# Patient Record
Sex: FEMALE | Race: WHITE | Employment: OTHER | URBAN - NONMETROPOLITAN AREA
[De-identification: names, ages, dates, MRNs, and addresses within clinical notes are randomized per-mention and may not be internally consistent; named-entity substitution may affect disease eponyms.]

---

## 2018-12-20 ENCOUNTER — HOSPITAL ENCOUNTER (EMERGENCY)
Age: 63
Discharge: HOME | End: 2018-12-20
Payer: OTHER MISCELLANEOUS

## 2018-12-20 ENCOUNTER — APPOINTMENT (OUTPATIENT)
Dept: GENERAL RADIOLOGY | Age: 63
End: 2018-12-20
Payer: OTHER MISCELLANEOUS

## 2018-12-20 VITALS
SYSTOLIC BLOOD PRESSURE: 124 MMHG | TEMPERATURE: 97.8 F | HEART RATE: 101 BPM | RESPIRATION RATE: 12 BRPM | DIASTOLIC BLOOD PRESSURE: 94 MMHG | OXYGEN SATURATION: 94 %

## 2018-12-20 DIAGNOSIS — S01.81XA FACIAL LACERATION, INITIAL ENCOUNTER: ICD-10-CM
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
## 2018-12-20 DIAGNOSIS — S62.101A CLOSED FRACTURE OF RIGHT WRIST, INITIAL ENCOUNTER: ICD-10-CM

## 2018-12-20 PROCEDURE — 12015 RPR F/E/E/N/L/M 7.6-12.5 CM: CPT

## 2018-12-20 PROCEDURE — 99284 EMERGENCY DEPT VISIT MOD MDM: CPT

## 2018-12-20 PROCEDURE — 73110 X-RAY EXAM OF WRIST: CPT

## 2018-12-20 RX ORDER — AMOXICILLIN 875 MG/1
875 TABLET, COATED ORAL 2 TIMES DAILY
Qty: 20 TABLET | Refills: 0 | Status: SHIPPED | OUTPATIENT
Start: 2018-12-20 | End: 2018-12-30

## 2018-12-20 RX ORDER — TOPIRAMATE 100 MG/1
100 TABLET, FILM COATED ORAL 2 TIMES DAILY
Status: ON HOLD | COMMUNITY
End: 2019-10-01 | Stop reason: HOSPADM

## 2018-12-20 RX ORDER — LIDOCAINE HYDROCHLORIDE 10 MG/ML
INJECTION, SOLUTION INFILTRATION; PERINEURAL
Status: DISCONTINUED
Start: 2018-12-20 | End: 2018-12-20 | Stop reason: HOSPADM

## 2018-12-20 RX ORDER — ACETAMINOPHEN AND CODEINE PHOSPHATE 300; 30 MG/1; MG/1
1-2 TABLET ORAL EVERY 6 HOURS PRN
Qty: 20 TABLET | Refills: 0 | Status: SHIPPED | OUTPATIENT
Start: 2018-12-20 | End: 2018-12-25

## 2018-12-20 ASSESSMENT — PAIN SCALES - GENERAL: PAINLEVEL_OUTOF10: 4

## 2018-12-20 ASSESSMENT — PAIN DESCRIPTION - ORIENTATION: ORIENTATION: RIGHT

## 2018-12-20 ASSESSMENT — ENCOUNTER SYMPTOMS: ROS SKIN COMMENTS: SEE HPI

## 2018-12-20 ASSESSMENT — PAIN DESCRIPTION - PAIN TYPE: TYPE: ACUTE PAIN

## 2018-12-20 NOTE — ED PROVIDER NOTES
products; and Soap    FAMILY HISTORY       Family History   Problem Relation Age of Onset    Diabetes Father     Other Father         CAD        SOCIAL HISTORY       Social History     Social History    Marital status:      Spouse name: N/A    Number of children: N/A    Years of education: N/A     Social History Main Topics    Smoking status: Never Smoker    Smokeless tobacco: Never Used    Alcohol use No    Drug use: No    Sexual activity: Not Asked     Other Topics Concern    None     Social History Narrative    None       REVIEW OF SYSTEMS       Review of Systems   HENT:        See HPI   Musculoskeletal:        See HPI   Skin:        See HPI   All other systems reviewed and are negative. Review of systems as in HPI & PMH otherwise negative    PHYSICAL EXAM    (up to 7 for level 4, 8 ormore for level 5)     ED Triage Vitals [12/20/18 1144]   BP Temp Temp Source Pulse Resp SpO2 Height Weight   (!) 124/94 97.8 °F (36.6 °C) Tympanic 101 12 94 % -- --       Physical Exam   Constitutional: She is oriented to person, place, and time. She appears well-developed and well-nourished. HENT:   Head: Normocephalic. Laceration right cheek   Eyes: Pupils are equal, round, and reactive to light. Conjunctivae are normal. No scleral icterus. Neck: Normal range of motion. Neck supple. Cardiovascular: Normal rate and regular rhythm. Pulmonary/Chest: Effort normal and breath sounds normal.   Musculoskeletal: Normal range of motion. Pain right wrist with movement   Neurological: She is alert and oriented to person, place, and time. No cranial nerve deficit. Coordination normal.   Skin: Skin is warm and dry. Psychiatric: She has a normal mood and affect.  Her behavior is normal. Judgment and thought content normal.       DIAGNOSTIC RESULTS     EKG: (none if blank)  All EKG's are interpreted by the Emergency Department Physician who either signs or Co-signs this chart in the absence of a

## 2019-01-08 ENCOUNTER — OFFICE VISIT (OUTPATIENT)
Dept: ORTHOPEDIC SURGERY | Age: 64
End: 2019-01-08
Payer: OTHER MISCELLANEOUS

## 2019-01-08 VITALS — WEIGHT: 180 LBS | BODY MASS INDEX: 28.25 KG/M2 | HEIGHT: 67 IN

## 2019-01-08 DIAGNOSIS — M25.531 RIGHT WRIST PAIN: Primary | ICD-10-CM

## 2019-01-08 PROCEDURE — 99203 OFFICE O/P NEW LOW 30 MIN: CPT | Performed by: ORTHOPAEDIC SURGERY

## 2019-06-10 PROBLEM — R52 PAINFUL CUTANEOUS SCAR: Status: ACTIVE | Noted: 2019-06-10

## 2019-06-10 PROBLEM — L90.5 PAINFUL CUTANEOUS SCAR: Status: ACTIVE | Noted: 2019-06-10

## 2019-09-29 ENCOUNTER — HOSPITAL ENCOUNTER (OUTPATIENT)
Age: 64
Setting detail: OBSERVATION
Discharge: HOME | End: 2019-10-01
Attending: EMERGENCY MEDICINE | Admitting: SURGERY
Payer: COMMERCIAL

## 2019-09-29 ENCOUNTER — APPOINTMENT (OUTPATIENT)
Dept: CT IMAGING | Age: 64
End: 2019-09-29
Payer: COMMERCIAL

## 2019-09-29 ENCOUNTER — APPOINTMENT (OUTPATIENT)
Dept: GENERAL RADIOLOGY | Age: 64
End: 2019-09-29
Payer: COMMERCIAL

## 2019-09-29 DIAGNOSIS — T07.XXXA MULTIPLE CONTUSIONS: ICD-10-CM
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
## 2019-09-29 DIAGNOSIS — S06.0X9A CLOSED HEAD INJURY WITH CONCUSSION, WITH LOSS OF CONSCIOUSNESS, INITIAL ENCOUNTER: ICD-10-CM

## 2019-09-29 LAB
ABSOLUTE EOS #: 0.2 K/UL (ref 0–0.4)
ABSOLUTE IMMATURE GRANULOCYTE: ABNORMAL K/UL (ref 0–0.3)
ABSOLUTE LYMPH #: 1.5 K/UL (ref 1–4.8)
ABSOLUTE MONO #: 0.7 K/UL (ref 0.1–1.3)
ALBUMIN SERPL-MCNC: 4 G/DL (ref 3.5–5.2)
ALBUMIN/GLOBULIN RATIO: ABNORMAL (ref 1–2.5)
ALP BLD-CCNC: 129 U/L (ref 35–104)
ALT SERPL-CCNC: 25 U/L (ref 5–33)
ANION GAP SERPL CALCULATED.3IONS-SCNC: 14 MMOL/L (ref 9–17)
AST SERPL-CCNC: 26 U/L
BASOPHILS # BLD: 1 % (ref 0–2)
BASOPHILS ABSOLUTE: 0 K/UL (ref 0–0.2)
BILIRUB SERPL-MCNC: 0.35 MG/DL (ref 0.3–1.2)
BUN BLDV-MCNC: 14 MG/DL (ref 8–23)
BUN/CREAT BLD: ABNORMAL (ref 9–20)
CALCIUM SERPL-MCNC: 9.3 MG/DL (ref 8.6–10.4)
CHLORIDE BLD-SCNC: 102 MMOL/L (ref 98–107)
CO2: 24 MMOL/L (ref 20–31)
CREAT SERPL-MCNC: 0.9 MG/DL (ref 0.5–0.9)
DIFFERENTIAL TYPE: ABNORMAL
EOSINOPHILS RELATIVE PERCENT: 2 % (ref 0–4)
GFR AFRICAN AMERICAN: >60 ML/MIN
GFR NON-AFRICAN AMERICAN: >60 ML/MIN
GFR SERPL CREATININE-BSD FRML MDRD: ABNORMAL ML/MIN/{1.73_M2}
GFR SERPL CREATININE-BSD FRML MDRD: ABNORMAL ML/MIN/{1.73_M2}
GLUCOSE BLD-MCNC: 156 MG/DL (ref 70–99)
HCT VFR BLD CALC: 42.5 % (ref 36–46)
HEMOGLOBIN: 14.4 G/DL (ref 12–16)
IMMATURE GRANULOCYTES: ABNORMAL %
INR BLD: 0.9
LYMPHOCYTES # BLD: 17 % (ref 24–44)
MCH RBC QN AUTO: 28.5 PG (ref 26–34)
MCHC RBC AUTO-ENTMCNC: 33.9 G/DL (ref 31–37)
MCV RBC AUTO: 84 FL (ref 80–100)
MONOCYTES # BLD: 9 % (ref 1–7)
NRBC AUTOMATED: ABNORMAL PER 100 WBC
PDW BLD-RTO: 13.9 % (ref 11.5–14.9)
PLATELET # BLD: 273 K/UL (ref 150–450)
PLATELET ESTIMATE: ABNORMAL
PMV BLD AUTO: 8.4 FL (ref 6–12)
POTASSIUM SERPL-SCNC: 3.7 MMOL/L (ref 3.7–5.3)
PROTHROMBIN TIME: 12.5 SEC (ref 11.8–14.6)
RBC # BLD: 5.06 M/UL (ref 4–5.2)
RBC # BLD: ABNORMAL 10*6/UL
SEG NEUTROPHILS: 71 % (ref 36–66)
SEGMENTED NEUTROPHILS ABSOLUTE COUNT: 6.1 K/UL (ref 1.3–9.1)
SODIUM BLD-SCNC: 140 MMOL/L (ref 135–144)
TOTAL PROTEIN: 7 G/DL (ref 6.4–8.3)
TROPONIN INTERP: NORMAL
TROPONIN T: NORMAL NG/ML
TROPONIN, HIGH SENSITIVITY: <6 NG/L (ref 0–14)
WBC # BLD: 8.5 K/UL (ref 3.5–11)
WBC # BLD: ABNORMAL 10*3/UL

## 2019-09-29 PROCEDURE — 93005 ELECTROCARDIOGRAM TRACING: CPT | Performed by: EMERGENCY MEDICINE

## 2019-09-29 PROCEDURE — 96375 TX/PRO/DX INJ NEW DRUG ADDON: CPT

## 2019-09-29 PROCEDURE — G0378 HOSPITAL OBSERVATION PER HR: HCPCS

## 2019-09-29 PROCEDURE — 96376 TX/PRO/DX INJ SAME DRUG ADON: CPT

## 2019-09-29 PROCEDURE — 2580000003 HC RX 258: Performed by: EMERGENCY MEDICINE

## 2019-09-29 PROCEDURE — 84484 ASSAY OF TROPONIN QUANT: CPT

## 2019-09-29 PROCEDURE — 73562 X-RAY EXAM OF KNEE 3: CPT

## 2019-09-29 PROCEDURE — 73130 X-RAY EXAM OF HAND: CPT

## 2019-09-29 PROCEDURE — 36415 COLL VENOUS BLD VENIPUNCTURE: CPT

## 2019-09-29 PROCEDURE — 74177 CT ABD & PELVIS W/CONTRAST: CPT

## 2019-09-29 PROCEDURE — 72125 CT NECK SPINE W/O DYE: CPT

## 2019-09-29 PROCEDURE — 70450 CT HEAD/BRAIN W/O DYE: CPT

## 2019-09-29 PROCEDURE — 6360000004 HC RX CONTRAST MEDICATION: Performed by: EMERGENCY MEDICINE

## 2019-09-29 PROCEDURE — 80053 COMPREHEN METABOLIC PANEL: CPT

## 2019-09-29 PROCEDURE — 6370000000 HC RX 637 (ALT 250 FOR IP): Performed by: SURGERY

## 2019-09-29 PROCEDURE — 85025 COMPLETE CBC W/AUTO DIFF WBC: CPT

## 2019-09-29 PROCEDURE — 96374 THER/PROPH/DIAG INJ IV PUSH: CPT

## 2019-09-29 PROCEDURE — 6360000002 HC RX W HCPCS: Performed by: EMERGENCY MEDICINE

## 2019-09-29 PROCEDURE — 90471 IMMUNIZATION ADMIN: CPT | Performed by: EMERGENCY MEDICINE

## 2019-09-29 PROCEDURE — 85610 PROTHROMBIN TIME: CPT

## 2019-09-29 PROCEDURE — 99285 EMERGENCY DEPT VISIT HI MDM: CPT

## 2019-09-29 PROCEDURE — 70486 CT MAXILLOFACIAL W/O DYE: CPT

## 2019-09-29 PROCEDURE — 96365 THER/PROPH/DIAG IV INF INIT: CPT

## 2019-09-29 PROCEDURE — 90715 TDAP VACCINE 7 YRS/> IM: CPT | Performed by: EMERGENCY MEDICINE

## 2019-09-29 RX ORDER — NORTRIPTYLINE HYDROCHLORIDE 10 MG/1
10 CAPSULE ORAL PRN
COMMUNITY
End: 2022-02-16

## 2019-09-29 RX ORDER — ONDANSETRON 2 MG/ML
4 INJECTION INTRAMUSCULAR; INTRAVENOUS EVERY 8 HOURS PRN
Status: DISCONTINUED | OUTPATIENT
Start: 2019-09-29 | End: 2019-10-01 | Stop reason: HOSPADM

## 2019-09-29 RX ORDER — 0.9 % SODIUM CHLORIDE 0.9 %
80 INTRAVENOUS SOLUTION INTRAVENOUS ONCE
Status: COMPLETED | OUTPATIENT
Start: 2019-09-29 | End: 2019-09-29

## 2019-09-29 RX ORDER — SODIUM CHLORIDE 0.9 % (FLUSH) 0.9 %
10 SYRINGE (ML) INJECTION PRN
Status: DISCONTINUED | OUTPATIENT
Start: 2019-09-29 | End: 2019-10-01 | Stop reason: HOSPADM

## 2019-09-29 RX ORDER — FENTANYL CITRATE 50 UG/ML
50 INJECTION, SOLUTION INTRAMUSCULAR; INTRAVENOUS
Status: DISCONTINUED | OUTPATIENT
Start: 2019-09-29 | End: 2019-10-01 | Stop reason: HOSPADM

## 2019-09-29 RX ORDER — SODIUM CHLORIDE 9 MG/ML
INJECTION, SOLUTION INTRAVENOUS CONTINUOUS
Status: DISCONTINUED | OUTPATIENT
Start: 2019-09-29 | End: 2019-10-01 | Stop reason: HOSPADM

## 2019-09-29 RX ORDER — SODIUM CHLORIDE 0.9 % (FLUSH) 0.9 %
10 SYRINGE (ML) INJECTION EVERY 12 HOURS SCHEDULED
Status: DISCONTINUED | OUTPATIENT
Start: 2019-09-29 | End: 2019-10-01 | Stop reason: HOSPADM

## 2019-09-29 RX ORDER — PROMETHAZINE HYDROCHLORIDE 25 MG/1
25 TABLET ORAL EVERY 6 HOURS PRN
COMMUNITY
End: 2021-01-28

## 2019-09-29 RX ORDER — ACETAMINOPHEN 325 MG/1
650 TABLET ORAL EVERY 4 HOURS PRN
Status: DISCONTINUED | OUTPATIENT
Start: 2019-09-29 | End: 2019-10-01 | Stop reason: HOSPADM

## 2019-09-29 RX ORDER — FENTANYL CITRATE 50 UG/ML
100 INJECTION, SOLUTION INTRAMUSCULAR; INTRAVENOUS ONCE
Status: COMPLETED | OUTPATIENT
Start: 2019-09-29 | End: 2019-09-29

## 2019-09-29 RX ORDER — HYDROCODONE BITARTRATE AND ACETAMINOPHEN 5; 325 MG/1; MG/1
1 TABLET ORAL EVERY 4 HOURS PRN
Status: DISCONTINUED | OUTPATIENT
Start: 2019-09-29 | End: 2019-10-01 | Stop reason: HOSPADM

## 2019-09-29 RX ADMIN — SODIUM CHLORIDE: 9 INJECTION, SOLUTION INTRAVENOUS at 18:53

## 2019-09-29 RX ADMIN — HYDROCODONE BITARTRATE AND ACETAMINOPHEN 1 TABLET: 5; 325 TABLET ORAL at 18:52

## 2019-09-29 RX ADMIN — DEXTROSE MONOHYDRATE 1 G: 5 INJECTION INTRAVENOUS at 17:10

## 2019-09-29 RX ADMIN — FENTANYL CITRATE 100 MCG: 50 INJECTION INTRAMUSCULAR; INTRAVENOUS at 14:37

## 2019-09-29 RX ADMIN — SODIUM CHLORIDE 80 ML: 9 INJECTION, SOLUTION INTRAVENOUS at 15:33

## 2019-09-29 RX ADMIN — Medication 10 ML: at 15:34

## 2019-09-29 RX ADMIN — IOVERSOL 75 ML: 741 INJECTION INTRA-ARTERIAL; INTRAVENOUS at 15:33

## 2019-09-29 RX ADMIN — HYDROCODONE BITARTRATE AND ACETAMINOPHEN 1 TABLET: 5; 325 TABLET ORAL at 23:04

## 2019-09-29 RX ADMIN — FENTANYL CITRATE 100 MCG: 50 INJECTION INTRAMUSCULAR; INTRAVENOUS at 17:09

## 2019-09-29 RX ADMIN — TETANUS TOXOID, REDUCED DIPHTHERIA TOXOID AND ACELLULAR PERTUSSIS VACCINE, ADSORBED 0.5 ML: 5; 2.5; 8; 8; 2.5 SUSPENSION INTRAMUSCULAR at 17:06

## 2019-09-29 ASSESSMENT — PAIN SCALES - GENERAL
PAINLEVEL_OUTOF10: 10
PAINLEVEL_OUTOF10: 7
PAINLEVEL_OUTOF10: 6
PAINLEVEL_OUTOF10: 4
PAINLEVEL_OUTOF10: 4
PAINLEVEL_OUTOF10: 3
PAINLEVEL_OUTOF10: 7
PAINLEVEL_OUTOF10: 8
PAINLEVEL_OUTOF10: 3

## 2019-09-29 ASSESSMENT — ENCOUNTER SYMPTOMS
COUGH: 0
ABDOMINAL PAIN: 1

## 2019-09-30 PROCEDURE — 6370000000 HC RX 637 (ALT 250 FOR IP): Performed by: SURGERY

## 2019-09-30 PROCEDURE — 2580000003 HC RX 258: Performed by: EMERGENCY MEDICINE

## 2019-09-30 PROCEDURE — 99219 PR INITIAL OBSERVATION CARE/DAY 50 MINUTES: CPT | Performed by: PSYCHIATRY & NEUROLOGY

## 2019-09-30 PROCEDURE — G0378 HOSPITAL OBSERVATION PER HR: HCPCS

## 2019-09-30 PROCEDURE — 95819 EEG AWAKE AND ASLEEP: CPT

## 2019-09-30 PROCEDURE — 95819 EEG AWAKE AND ASLEEP: CPT | Performed by: PSYCHIATRY & NEUROLOGY

## 2019-09-30 PROCEDURE — 2580000003 HC RX 258: Performed by: SURGERY

## 2019-09-30 RX ORDER — NORTRIPTYLINE HYDROCHLORIDE 10 MG/1
10 CAPSULE ORAL DAILY PRN
Status: DISCONTINUED | OUTPATIENT
Start: 2019-09-30 | End: 2019-10-01 | Stop reason: HOSPADM

## 2019-09-30 RX ORDER — PROMETHAZINE HYDROCHLORIDE 25 MG/1
25 TABLET ORAL EVERY 6 HOURS PRN
Status: DISCONTINUED | OUTPATIENT
Start: 2019-09-30 | End: 2019-10-01 | Stop reason: HOSPADM

## 2019-09-30 RX ADMIN — SODIUM CHLORIDE: 9 INJECTION, SOLUTION INTRAVENOUS at 09:31

## 2019-09-30 RX ADMIN — Medication 10 ML: at 21:57

## 2019-09-30 RX ADMIN — HYDROCODONE BITARTRATE AND ACETAMINOPHEN 1 TABLET: 5; 325 TABLET ORAL at 23:39

## 2019-09-30 RX ADMIN — HYDROCODONE BITARTRATE AND ACETAMINOPHEN 1 TABLET: 5; 325 TABLET ORAL at 14:39

## 2019-09-30 RX ADMIN — SODIUM CHLORIDE: 9 INJECTION, SOLUTION INTRAVENOUS at 01:33

## 2019-09-30 RX ADMIN — HYDROCODONE BITARTRATE AND ACETAMINOPHEN 1 TABLET: 5; 325 TABLET ORAL at 07:38

## 2019-09-30 RX ADMIN — HYDROCODONE BITARTRATE AND ACETAMINOPHEN 1 TABLET: 5; 325 TABLET ORAL at 19:25

## 2019-09-30 RX ADMIN — SODIUM CHLORIDE: 9 INJECTION, SOLUTION INTRAVENOUS at 17:39

## 2019-09-30 RX ADMIN — Medication 10 ML: at 07:45

## 2019-09-30 ASSESSMENT — PAIN DESCRIPTION - FREQUENCY
FREQUENCY: CONTINUOUS
FREQUENCY: CONTINUOUS

## 2019-09-30 ASSESSMENT — PAIN DESCRIPTION - DESCRIPTORS
DESCRIPTORS: DISCOMFORT;SORE
DESCRIPTORS: DISCOMFORT;SORE

## 2019-09-30 ASSESSMENT — PAIN DESCRIPTION - PROGRESSION
CLINICAL_PROGRESSION: GRADUALLY WORSENING
CLINICAL_PROGRESSION: GRADUALLY WORSENING

## 2019-09-30 ASSESSMENT — PAIN SCALES - GENERAL
PAINLEVEL_OUTOF10: 6
PAINLEVEL_OUTOF10: 8
PAINLEVEL_OUTOF10: 4
PAINLEVEL_OUTOF10: 3
PAINLEVEL_OUTOF10: 9
PAINLEVEL_OUTOF10: 5
PAINLEVEL_OUTOF10: 7

## 2019-09-30 ASSESSMENT — PAIN DESCRIPTION - PAIN TYPE
TYPE: ACUTE PAIN
TYPE: ACUTE PAIN

## 2019-09-30 ASSESSMENT — PAIN DESCRIPTION - LOCATION
LOCATION: GENERALIZED
LOCATION: CHEST;ABDOMEN
LOCATION: CHEST;ABDOMEN

## 2019-09-30 ASSESSMENT — PAIN DESCRIPTION - ONSET
ONSET: ON-GOING
ONSET: ON-GOING

## 2019-10-01 VITALS
SYSTOLIC BLOOD PRESSURE: 129 MMHG | TEMPERATURE: 97.7 F | OXYGEN SATURATION: 96 % | WEIGHT: 187.39 LBS | HEIGHT: 66 IN | HEART RATE: 84 BPM | BODY MASS INDEX: 30.12 KG/M2 | DIASTOLIC BLOOD PRESSURE: 72 MMHG | RESPIRATION RATE: 15 BRPM

## 2019-10-01 LAB
EKG ATRIAL RATE: 97 BPM
EKG P AXIS: 60 DEGREES
EKG P-R INTERVAL: 152 MS
EKG Q-T INTERVAL: 328 MS
EKG QRS DURATION: 88 MS
EKG QTC CALCULATION (BAZETT): 416 MS
EKG R AXIS: 21 DEGREES
EKG T AXIS: 25 DEGREES
EKG VENTRICULAR RATE: 97 BPM
LV EF: 55 %
LVEF MODALITY: NORMAL

## 2019-10-01 PROCEDURE — 99225 PR SBSQ OBSERVATION CARE/DAY 25 MINUTES: CPT | Performed by: PSYCHIATRY & NEUROLOGY

## 2019-10-01 PROCEDURE — 2580000003 HC RX 258: Performed by: EMERGENCY MEDICINE

## 2019-10-01 PROCEDURE — 93306 TTE W/DOPPLER COMPLETE: CPT

## 2019-10-01 PROCEDURE — 6370000000 HC RX 637 (ALT 250 FOR IP): Performed by: SURGERY

## 2019-10-01 PROCEDURE — G0378 HOSPITAL OBSERVATION PER HR: HCPCS

## 2019-10-01 PROCEDURE — 97161 PT EVAL LOW COMPLEX 20 MIN: CPT

## 2019-10-01 PROCEDURE — 93010 ELECTROCARDIOGRAM REPORT: CPT | Performed by: INTERNAL MEDICINE

## 2019-10-01 PROCEDURE — 97165 OT EVAL LOW COMPLEX 30 MIN: CPT

## 2019-10-01 PROCEDURE — 2580000003 HC RX 258: Performed by: SURGERY

## 2019-10-01 RX ORDER — HYDROCODONE BITARTRATE AND ACETAMINOPHEN 5; 325 MG/1; MG/1
1 TABLET ORAL EVERY 4 HOURS PRN
Qty: 10 TABLET | Refills: 0 | Status: SHIPPED | OUTPATIENT
Start: 2019-10-01 | End: 2019-10-02 | Stop reason: SDUPTHER

## 2019-10-01 RX ADMIN — Medication 10 ML: at 07:15

## 2019-10-01 RX ADMIN — HYDROCODONE BITARTRATE AND ACETAMINOPHEN 1 TABLET: 5; 325 TABLET ORAL at 07:14

## 2019-10-01 RX ADMIN — SODIUM CHLORIDE: 9 INJECTION, SOLUTION INTRAVENOUS at 09:00

## 2019-10-01 RX ADMIN — SODIUM CHLORIDE: 9 INJECTION, SOLUTION INTRAVENOUS at 01:47

## 2019-10-01 RX ADMIN — HYDROCODONE BITARTRATE AND ACETAMINOPHEN 1 TABLET: 5; 325 TABLET ORAL at 12:25

## 2019-10-01 ASSESSMENT — PAIN DESCRIPTION - PAIN TYPE
TYPE: ACUTE PAIN

## 2019-10-01 ASSESSMENT — PAIN DESCRIPTION - DESCRIPTORS
DESCRIPTORS: ACHING

## 2019-10-01 ASSESSMENT — PAIN DESCRIPTION - ONSET: ONSET: ON-GOING

## 2019-10-01 ASSESSMENT — PAIN SCALES - GENERAL
PAINLEVEL_OUTOF10: 0
PAINLEVEL_OUTOF10: 5
PAINLEVEL_OUTOF10: 4
PAINLEVEL_OUTOF10: 3
PAINLEVEL_OUTOF10: 3
PAINLEVEL_OUTOF10: 4
PAINLEVEL_OUTOF10: 6

## 2019-10-01 ASSESSMENT — PAIN DESCRIPTION - FREQUENCY
FREQUENCY: INTERMITTENT
FREQUENCY: INTERMITTENT
FREQUENCY: CONTINUOUS

## 2019-10-01 ASSESSMENT — PAIN DESCRIPTION - LOCATION
LOCATION: BUTTOCKS
LOCATION: BACK;GENERALIZED;GROIN
LOCATION: BUTTOCKS

## 2019-12-26 ENCOUNTER — HOSPITAL ENCOUNTER (OUTPATIENT)
Dept: NUCLEAR MEDICINE | Age: 64
Discharge: HOME | End: 2019-12-28
Payer: MEDICARE

## 2019-12-26 ENCOUNTER — HOSPITAL ENCOUNTER (OUTPATIENT)
Dept: NON INVASIVE DIAGNOSTICS | Age: 64
Discharge: HOME | End: 2019-12-26
Payer: MEDICARE

## 2019-12-26 VITALS — BODY MASS INDEX: 28.25 KG/M2 | HEIGHT: 67 IN | WEIGHT: 180 LBS

## 2019-12-26 DIAGNOSIS — R06.02 SOB (SHORTNESS OF BREATH) ON EXERTION: ICD-10-CM

## 2019-12-26 DIAGNOSIS — R07.89 CHEST PRESSURE: ICD-10-CM

## 2019-12-26 LAB
LV EF: 70 %
LVEF MODALITY: NORMAL

## 2019-12-26 PROCEDURE — A9500 TC99M SESTAMIBI: HCPCS | Performed by: NURSE PRACTITIONER

## 2019-12-26 PROCEDURE — 2580000003 HC RX 258: Performed by: NURSE PRACTITIONER

## 2019-12-26 PROCEDURE — 3430000000 HC RX DIAGNOSTIC RADIOPHARMACEUTICAL: Performed by: NURSE PRACTITIONER

## 2019-12-26 PROCEDURE — 78452 HT MUSCLE IMAGE SPECT MULT: CPT

## 2019-12-26 PROCEDURE — 93017 CV STRESS TEST TRACING ONLY: CPT

## 2019-12-26 RX ORDER — ATROPINE SULFATE 0.1 MG/ML
0.5 INJECTION INTRAVENOUS EVERY 5 MIN PRN
Status: ACTIVE | OUTPATIENT
Start: 2019-12-26 | End: 2019-12-26

## 2019-12-26 RX ORDER — SODIUM CHLORIDE 0.9 % (FLUSH) 0.9 %
10 SYRINGE (ML) INJECTION PRN
Status: DISCONTINUED | OUTPATIENT
Start: 2019-12-26 | End: 2019-12-29 | Stop reason: HOSPADM

## 2019-12-26 RX ORDER — SODIUM CHLORIDE 9 MG/ML
500 INJECTION, SOLUTION INTRAVENOUS CONTINUOUS PRN
Status: ACTIVE | OUTPATIENT
Start: 2019-12-26 | End: 2019-12-26

## 2019-12-26 RX ORDER — METOPROLOL TARTRATE 5 MG/5ML
5 INJECTION INTRAVENOUS EVERY 5 MIN PRN
Status: ACTIVE | OUTPATIENT
Start: 2019-12-26 | End: 2019-12-26

## 2019-12-26 RX ORDER — ALBUTEROL SULFATE 90 UG/1
2 AEROSOL, METERED RESPIRATORY (INHALATION) PRN
Status: ACTIVE | OUTPATIENT
Start: 2019-12-26 | End: 2019-12-26

## 2019-12-26 RX ORDER — SODIUM CHLORIDE 0.9 % (FLUSH) 0.9 %
10 SYRINGE (ML) INJECTION PRN
Status: ACTIVE | OUTPATIENT
Start: 2019-12-26 | End: 2019-12-26

## 2019-12-26 RX ORDER — NITROGLYCERIN 0.4 MG/1
0.4 TABLET SUBLINGUAL EVERY 5 MIN PRN
Status: ACTIVE | OUTPATIENT
Start: 2019-12-26 | End: 2019-12-26

## 2019-12-26 RX ADMIN — Medication 10 ML: at 07:49

## 2019-12-26 RX ADMIN — TETRAKIS(2-METHOXYISOBUTYLISOCYANIDE)COPPER(I) TETRAFLUOROBORATE 41 MILLICURIE: 1 INJECTION, POWDER, LYOPHILIZED, FOR SOLUTION INTRAVENOUS at 09:10

## 2019-12-26 RX ADMIN — Medication 10 ML: at 09:10

## 2019-12-26 RX ADMIN — TETRAKIS(2-METHOXYISOBUTYLISOCYANIDE)COPPER(I) TETRAFLUOROBORATE 11.2 MILLICURIE: 1 INJECTION, POWDER, LYOPHILIZED, FOR SOLUTION INTRAVENOUS at 07:49

## 2019-12-26 RX ADMIN — Medication 10 ML: at 08:40

## 2020-10-14 ENCOUNTER — OFFICE VISIT (OUTPATIENT)
Dept: ORTHOPEDIC SURGERY | Age: 65
End: 2020-10-14
Payer: MEDICARE

## 2020-10-14 VITALS — HEIGHT: 67 IN | TEMPERATURE: 97.4 F | WEIGHT: 180 LBS | BODY MASS INDEX: 28.25 KG/M2

## 2020-10-14 PROCEDURE — 1123F ACP DISCUSS/DSCN MKR DOCD: CPT | Performed by: ORTHOPAEDIC SURGERY

## 2020-10-14 PROCEDURE — G8417 CALC BMI ABV UP PARAM F/U: HCPCS | Performed by: ORTHOPAEDIC SURGERY

## 2020-10-14 PROCEDURE — 3017F COLORECTAL CA SCREEN DOC REV: CPT | Performed by: ORTHOPAEDIC SURGERY

## 2020-10-14 PROCEDURE — G8400 PT W/DXA NO RESULTS DOC: HCPCS | Performed by: ORTHOPAEDIC SURGERY

## 2020-10-14 PROCEDURE — 99203 OFFICE O/P NEW LOW 30 MIN: CPT | Performed by: ORTHOPAEDIC SURGERY

## 2020-10-14 PROCEDURE — G8484 FLU IMMUNIZE NO ADMIN: HCPCS | Performed by: ORTHOPAEDIC SURGERY

## 2020-10-14 PROCEDURE — 4040F PNEUMOC VAC/ADMIN/RCVD: CPT | Performed by: ORTHOPAEDIC SURGERY

## 2020-10-14 PROCEDURE — G8427 DOCREV CUR MEDS BY ELIG CLIN: HCPCS | Performed by: ORTHOPAEDIC SURGERY

## 2020-10-14 PROCEDURE — 1036F TOBACCO NON-USER: CPT | Performed by: ORTHOPAEDIC SURGERY

## 2020-10-14 PROCEDURE — 1090F PRES/ABSN URINE INCON ASSESS: CPT | Performed by: ORTHOPAEDIC SURGERY

## 2020-10-14 NOTE — LETTER
10/14/2020    Alida White, APRN - CNP  [address] [address]    RE: Marcella Mckinney    Dear Dr. Gracie Campoverde,    Thank you for allowing me to participate in the care of Ms. Deshawn Peña. I had the opportunity to evaluate the patient on 10/14/2020. Attached you will find my evaluation and recommendations. Thanks again for the confidence you have expressed in me by allowing my participation in the care of your patient. I will keep you apprised of further developments in the patients treatment course as it progresses. If I can be of further assistance in any fashion, please feel free to contact me at your convenience.     Sincerely,        Danica Jones  Shoulder and Elbow Surgery

## 2020-10-14 NOTE — PROGRESS NOTES
Orthopedic Shoulder Encounter Note     Chief complaint: Right shoulder pain    HPI: Chip Mar is a 77 y.o. right-hand dominant female who presents for evaluation of her right shoulder. She is been having pain for about a month. No specific injury but states that she was engaged in a lot of home renovations, lifting heavy objects and landscaping for the past 6 weeks. She started having pain from her ear down to her scapular and on down her arm but now her pain is mostly over the posterior deltoid and anterior shoulder region as well as superior shoulder. It is a constant ache but she is also weak and is unable to raise her arm past shoulder level. She denies having any neck pain or associated numbness or tingling. She also denies any stiffness. Previous treatment:    NSAIDs: Motrin    Physical Therapy: No    Injections: None    Surgeries: None    Review of Systems:     Constitution: no fever or chills   Pain level: 2-3/10  Musculoskeletal: As noted in the HPI   Neurologic: no neurologic symptoms    Past Medical History  Nikki Boothe  has a past medical history of Anxiety, Benign essential HTN, and Frontal headache. Past Surgical History  Nikki Boothe  has a past surgical history that includes Tonsillectomy; lumbar fusion (1998); Rotator cuff repair; Hysterectomy; and back surgery. Current Medications  Current Outpatient Medications   Medication Sig Dispense Refill    Erenumab-aooe 140 MG/ML SOAJ Inject into the skin      acetaminophen (TYLENOL) 325 MG tablet Take 650 mg by mouth      promethazine (PHENERGAN) 25 MG tablet Take 25 mg by mouth every 6 hours as needed for Nausea (migraine)      nortriptyline (PAMELOR) 10 MG capsule Take 10 mg by mouth as needed       No current facility-administered medications for this visit. Allergies  Allergies have been reviewed. Nikki Boothe is allergic to latex; eggs or egg-derived products; and soap. Social History  Nikki Boothe  reports that she has never smoked.  She has never used smokeless tobacco. She reports that she does not drink alcohol or use drugs. Family History  Jahaira's family history includes Cancer in her father; Coronary Art Dis in her father; Diabetes in her father and sister; Other in her father and mother. Physical Exam:     Temp 97.4 °F (36.3 °C) (Infrared)   Ht 5' 7\" (1.702 m)   Wt 180 lb (81.6 kg)   BMI 28.19 kg/m²    General Appearance: alert, well appearing, and in no distress  Mental Status: alert, oriented to person, place, and time  Gait: normal  Head and neck: Full range of motion through the cervical spine and is nontender to palpation diffusely.   She has a negative Spurling's test.    Shoulder:    Skin: warm and dry, no rash or erythema; no swelling or obvious muscular atrophy  Vasculature: 2+ radial pulses bilaterally  Neuro: Sensation grossly intact to light touch diffusely  Tenderness: Tender to palpation over the anterior aspect of the right shoulder    ROM: (Degrees)    Right   A P   Left   A P    Elevation  90 140   Elevation  140   Abduction  90 150   Abduction  150    ER   70 85   ER   65   IR   L4    IR   T10   90 abd/ER      90 abd/ER     90 abd/IR      90 abd/IR     Crepitation  No    Crepitation No  Dyskenesia  No    Dyskenesia No      Muscle strength:    Right       Left    Deltoid   5    Deltoid   5  Supraspinatus  3    Supraspinatus  5  ER   5    ER   5  IR   5    IR   5    Special tests    Right   Rotator Cuff    Left    y   Painful arc    n   n   Pain with ER    n    y   Neer's     n    n   Hawkin's    n    n   Drop Arm    n  n   Lift off/Belly Press   n  n   ER Lag    n          AC Joint  n   AC tenderness   n  n   Cross-chest adduction  n       Labrum/biceps    n   Elliott's    n   n   Biceps sheer    n      n   Speed's/Yergason's   n    y   Tenderness Biceps Groove  n    n   Francisco's    n         Instability  n   Ant Apprehension   n    n   Post Apprehension   n    n   Ant Load shift    n    n   Post Load

## 2020-10-19 ENCOUNTER — HOSPITAL ENCOUNTER (OUTPATIENT)
Age: 65
Setting detail: SPECIMEN
Discharge: HOME | End: 2020-10-19
Payer: MEDICARE

## 2020-10-19 ENCOUNTER — HOSPITAL ENCOUNTER (OUTPATIENT)
Dept: MRI IMAGING | Facility: CLINIC | Age: 65
Discharge: HOME | End: 2020-10-21
Payer: MEDICARE

## 2020-10-19 LAB
ABSOLUTE EOS #: 0.16 K/UL (ref 0–0.44)
ABSOLUTE IMMATURE GRANULOCYTE: <0.03 K/UL (ref 0–0.3)
ABSOLUTE LYMPH #: 1.27 K/UL (ref 1.1–3.7)
ABSOLUTE MONO #: 0.69 K/UL (ref 0.1–1.2)
ALBUMIN SERPL-MCNC: 4 G/DL (ref 3.5–5.2)
ALBUMIN/GLOBULIN RATIO: 1.4 (ref 1–2.5)
ALP BLD-CCNC: 105 U/L (ref 35–104)
ALT SERPL-CCNC: 17 U/L (ref 5–33)
ANION GAP SERPL CALCULATED.3IONS-SCNC: 9 MMOL/L (ref 9–17)
AST SERPL-CCNC: 18 U/L
BASOPHILS # BLD: 1 % (ref 0–2)
BASOPHILS ABSOLUTE: 0.05 K/UL (ref 0–0.2)
BILIRUB SERPL-MCNC: 0.36 MG/DL (ref 0.3–1.2)
BUN BLDV-MCNC: 23 MG/DL (ref 8–23)
BUN/CREAT BLD: ABNORMAL (ref 9–20)
CALCIUM SERPL-MCNC: 9.8 MG/DL (ref 8.6–10.4)
CHLORIDE BLD-SCNC: 106 MMOL/L (ref 98–107)
CHOLESTEROL/HDL RATIO: 4.2
CHOLESTEROL: 226 MG/DL
CO2: 27 MMOL/L (ref 20–31)
CREAT SERPL-MCNC: 1.02 MG/DL (ref 0.5–0.9)
DIFFERENTIAL TYPE: ABNORMAL
EOSINOPHILS RELATIVE PERCENT: 3 % (ref 1–4)
GFR AFRICAN AMERICAN: >60 ML/MIN
GFR NON-AFRICAN AMERICAN: 54 ML/MIN
GFR SERPL CREATININE-BSD FRML MDRD: ABNORMAL ML/MIN/{1.73_M2}
GFR SERPL CREATININE-BSD FRML MDRD: ABNORMAL ML/MIN/{1.73_M2}
GLUCOSE BLD-MCNC: 102 MG/DL (ref 70–99)
HCT VFR BLD CALC: 46.7 % (ref 36.3–47.1)
HDLC SERPL-MCNC: 54 MG/DL
HEMOGLOBIN: 14.2 G/DL (ref 11.9–15.1)
IMMATURE GRANULOCYTES: 0 %
LDL CHOLESTEROL: 150 MG/DL (ref 0–130)
LYMPHOCYTES # BLD: 26 % (ref 24–43)
MCH RBC QN AUTO: 27.7 PG (ref 25.2–33.5)
MCHC RBC AUTO-ENTMCNC: 30.4 G/DL (ref 28.4–34.8)
MCV RBC AUTO: 91 FL (ref 82.6–102.9)
MONOCYTES # BLD: 14 % (ref 3–12)
NRBC AUTOMATED: 0 PER 100 WBC
PDW BLD-RTO: 13 % (ref 11.8–14.4)
PLATELET # BLD: 292 K/UL (ref 138–453)
PLATELET ESTIMATE: ABNORMAL
PMV BLD AUTO: 10.6 FL (ref 8.1–13.5)
POTASSIUM SERPL-SCNC: 4.6 MMOL/L (ref 3.7–5.3)
RBC # BLD: 5.13 M/UL (ref 3.95–5.11)
RBC # BLD: ABNORMAL 10*6/UL
SEG NEUTROPHILS: 56 % (ref 36–65)
SEGMENTED NEUTROPHILS ABSOLUTE COUNT: 2.77 K/UL (ref 1.5–8.1)
SODIUM BLD-SCNC: 142 MMOL/L (ref 135–144)
TOTAL PROTEIN: 6.9 G/DL (ref 6.4–8.3)
TRIGL SERPL-MCNC: 109 MG/DL
VITAMIN D 25-HYDROXY: 10.5 NG/ML (ref 30–100)
VLDLC SERPL CALC-MCNC: ABNORMAL MG/DL (ref 1–30)
WBC # BLD: 5 K/UL (ref 3.5–11.3)
WBC # BLD: ABNORMAL 10*3/UL

## 2020-10-19 PROCEDURE — 73221 MRI JOINT UPR EXTREM W/O DYE: CPT

## 2020-11-02 ENCOUNTER — OFFICE VISIT (OUTPATIENT)
Dept: ORTHOPEDIC SURGERY | Age: 65
End: 2020-11-02
Payer: MEDICARE

## 2020-11-02 VITALS — HEIGHT: 67 IN | BODY MASS INDEX: 28.25 KG/M2 | WEIGHT: 180 LBS | TEMPERATURE: 97.2 F

## 2020-11-02 PROCEDURE — G8427 DOCREV CUR MEDS BY ELIG CLIN: HCPCS | Performed by: ORTHOPAEDIC SURGERY

## 2020-11-02 PROCEDURE — 4040F PNEUMOC VAC/ADMIN/RCVD: CPT | Performed by: ORTHOPAEDIC SURGERY

## 2020-11-02 PROCEDURE — G8484 FLU IMMUNIZE NO ADMIN: HCPCS | Performed by: ORTHOPAEDIC SURGERY

## 2020-11-02 PROCEDURE — 1036F TOBACCO NON-USER: CPT | Performed by: ORTHOPAEDIC SURGERY

## 2020-11-02 PROCEDURE — G8400 PT W/DXA NO RESULTS DOC: HCPCS | Performed by: ORTHOPAEDIC SURGERY

## 2020-11-02 PROCEDURE — 99212 OFFICE O/P EST SF 10 MIN: CPT | Performed by: ORTHOPAEDIC SURGERY

## 2020-11-02 PROCEDURE — 3017F COLORECTAL CA SCREEN DOC REV: CPT | Performed by: ORTHOPAEDIC SURGERY

## 2020-11-02 PROCEDURE — 1090F PRES/ABSN URINE INCON ASSESS: CPT | Performed by: ORTHOPAEDIC SURGERY

## 2020-11-02 PROCEDURE — 1123F ACP DISCUSS/DSCN MKR DOCD: CPT | Performed by: ORTHOPAEDIC SURGERY

## 2020-11-02 PROCEDURE — G8417 CALC BMI ABV UP PARAM F/U: HCPCS | Performed by: ORTHOPAEDIC SURGERY

## 2020-11-02 NOTE — PROGRESS NOTES
HPI: Ms. Suzanne Pittman is here today to review the results of her right shoulder MRI study which was completed on 10/19/2020. I did review the images with the patient today and it demonstrates some increased signal on T2-weighted images within the supraspinatus tendon consistent with possible intrasubstance tears versus tendinitis. There is also some increased signal and degenerative changes at the level of the acromioclavicular joint. The biceps appears to be intact. No obvious significant chondral damage. No obvious significant labral damage. I had a discussion with the patient today with regards implications of her MRI findings. We also discussed treatment options available to her. Today she indicates that she is really not having much pain in her shoulder as this has significantly improved but she continues to have some weakness. Consequently I recommended proceeding conservatively with over-the-counter anti-inflammatories as needed and a course of physical therapy a prescription of which was provided today. I anticipate resolution of her symptoms with this treatment but should she have persistent pain and weakness she was encouraged to follow-up for further evaluation and treatment.

## 2020-11-09 ENCOUNTER — HOSPITAL ENCOUNTER (OUTPATIENT)
Dept: PHYSICAL THERAPY | Age: 65
Setting detail: THERAPIES SERIES
Discharge: HOME | End: 2020-11-09
Payer: MEDICARE

## 2020-11-09 PROCEDURE — 97110 THERAPEUTIC EXERCISES: CPT

## 2020-11-09 PROCEDURE — 97161 PT EVAL LOW COMPLEX 20 MIN: CPT

## 2020-11-09 NOTE — CONSULTS
[facility] Outpatient Physical Therapy              [address]              Phone: [phone]              Fax: [phone]            Physical Therapy Upper Extremity Evaluation    Date:  2020  Patient: Madhu Salgado  : 1955  MRN: [id number]  Physician: Ananya Burrell MD   Insurance: Medicare  Medical Diagnosis:  Right rotator cuff tendinitis  Rehab Codes: M75.81  Onset Date: 20  Next 's appt. : --    Subjective:   CC/HPI: Pt reports to PT with R shoulder pain. Pt states that she was doing a lot of work outside at the end of September, and although she didn't hear or feel any popping, pt states that she had pain one night after working in which she had pain from her ear and all the way down to her middle arm. Pt states that she was told that she has a partial thickness tear that would not require surgery. Currently, pt feels that strength is her biggest issue. Pt denies any numbness or tingling in her arm or hand. PMHx:     [] Unremarkable             [x] Refer to full medical chart  In EPIC     Tests: [x] X-Ray: See EPIC   [x] MRI: See EPIC   [] Other:      Medications: [x] Refer to full medical record [] None [] Other:  Allergies:      [x] Refer to full medical record [] None [] Other:    Function:  Hand Dominance  [x] Right  [] Left  Employement Retired   Job status --   Work Activities/duties  --   Recreational Activities Reading       Pain present?  No   Location --   Pain Rating currently 0/10   Pain at worse 2/10   Pain at best 0/10   Description of pain Constant   Altered Sensation None   What makes it worse --   What makes it better --   Symptom progression Gotten better   Sleep Sleeping okay              Objective:      STRENGTH    Left Right   C5 Shld Abd 3+/5, pain 5/5   Shld Flexion 3-/5, pain 5/5   Shld IR 3+/5 4+/5   Shld ER 4-/5 4/5   Shld HAB     Shld Ext     C6 Elb Flex 4/5 5/5   C7 Elb Ext 4-/5 5/5   C8 EPL     T1 Fing Abd                Prone:     Retraction     Depression     IR     Abd     Scaption     Flexion                  AROM PROM    Left Right Left Right   Shld Abd WNL 79 NA    Shld Flex  , pain at end range   Shld IR T5 T12 NA WFL   Shld ER T3 C7 NA WFL   Shld HAB                     Elbow Flex       Elbow Ext       Supination       Pronation              Wrist flex        Wrist ext       Wrist UD       Wrist RD                          ROM   Cervical    Flexion WFL   Extension WFL   Rotation L- WFL R- WFL   Sidebend L- WFL R- WFL   Retraction            TESTS (+/-) LEFT RIGHT Not Tested   Vertebral A   [x]   CRLF    x   Speeds   [x]   Neers   [x]   Bush   [x]   Empty Can   [x]   Drop Arm   [x]   Post Apprehension   [x]   Ant Apprehension    [x]   Clunk   [x]   Sulcus   [x]   Elbow varus/valgus   [x]      []      []      []       OBSERVATION No Deficit Deficit Not Tested Comments   Posture       Forward Head [] [] []    Rounded Shoulders [] [] []    Kyphosis [] [] []    Lordosis [] [] []    Lateral Shift [] [] []    Scoliosis [] [] []    Iliac Crest [] [] []    PSIS [] [] []    ASIS [] [] []    Genu Valgus [] [] []    Genu Varus [] [] []    Genu Recurvatum [] [] []    Pronation [] [] []    Supination [] [] []    Leg Length Discrp [] [] []    Slumped Sitting [] [] []    Palpation [] [x] [] Tenderness to palpation along R UT, R pec major, R lateral arm diffusely   Sensation [x] [] []    Edema [] [] [x]    Neurological [] [] [x]        Flexibility Normal LUE Deficit RUE Deficit   UTrap [] [] [x]   L.Scap [] [] [x]   Scalenes  [] [] []   Pec Major [] [] [x]   Pec Minor [] [] [x]   Lats [] [] [x]   Supinators [] [] []   Pronators [] [] []   Other:                            FUNCTION Normal Difficult Unable   Sitting [x] [] []   Standing [x] [] []   Ambulation [x] [] []   Groom/Dress [] [x] []   Lift/Carry [] [x] []   Stairs [x] [] []   Bending [x] [] []   OH reach [] [] [x]   Sit to Stand [x] [] [] Assessment: Pt reports to PT with limitations in R shoulder strength and ROM, as well as increased pain with end range PROM, and poor functional use of R arm following tear of R RTC. Pt would benefit from PT in order to help improve her ROM and strength to ease use of arm for ADLs and to help decrease pain. STG: (to be met in 6 treatments)  1. ? Pain: Pt to decrease pain levels to 2/10 with all activities to ease ADLs and use of R shoulder. 2. ? ROM: Increase AROM of R shoulder to 150* flexion/abduction and ER to T2 and IR to T9 to help improve functional ROM for self-care. 3. Independent with Home Exercise Programs    LTG: (to be met in 12 treatments)  1. Improve score of QuickDASH from 48% impaired to < 20% impaired to improve functional use of R arm for ADLs. 2. ? Strength: Improve R shoulder strength to 5/5 grossly to decrease need for compensatory techniques with ADLs. 3. Pt will demonstrate full, pain-free AROM of R arm in all planes to allow for normal use of shoulder without need for compensatory techniques. 4. Decrease pain to 0/10 with all activities    Patient goals: Improve strength    Rehab Potential:  [x] Good  [] Fair  [] Poor   Suggested Professional Referral:  [x] No  [] Yes:  Barriers to Goal Achievement[de-identified]  [x] No  [] Yes:  Domestic Concerns:  [x] No  [] Yes:    Pt. Education:  [x] Plans/Goals, Risks/Benefits discussed  [x] Home exercise program  Method of Education: [x] Verbal  [x] Demo  [x] Written  Comprehension of Education:  [x] Verbalizes understanding. [x] Demonstrates understanding. [x] Needs Review. [] Demonstrates/verbalizes understanding of HEP/Ed previously given.     Treatment Plan:  [x] Therapeutic Exercise    [] Modalities:  [] Therapeutic Activity    [] Ultrasound  [] Electrical Stimulation  [] Gait Training     [] Lumbar/Cervical Traction  [] Neuromuscular Re-education [x] Cold/hotpack [] Iontophoresis: 4 mg/mL  [x] Instruction in HEP              Dexamethasone

## 2020-11-10 ENCOUNTER — HOSPITAL ENCOUNTER (OUTPATIENT)
Dept: PHYSICAL THERAPY | Age: 65
Setting detail: THERAPIES SERIES
Discharge: HOME | End: 2020-11-10
Payer: MEDICARE

## 2020-11-10 PROCEDURE — 97110 THERAPEUTIC EXERCISES: CPT

## 2020-11-10 NOTE — FLOWSHEET NOTE
[facility] Outpatient Physical Therapy              [address]              Phone: [phone]              Fax: [phone]      Physical Therapy Daily Treatment Note    Date:  11/10/2020  Patient Name:  Ian Dobbins    :  1955  MRN: [id number]  Physician: Martha Lynne MD                              Insurance: Medicare  Medical Diagnosis:  Right rotator cuff tendinitis                   Rehab Codes: M75.81  Onset Date: 20               Next 's appt. : --  Visit# / total visits: 2/12   Cancels/No Shows: 0/0    Subjective:  Pt presents to PT with minimal pain, stating that her shoulder is more tender this morning. Pain:  [x] Yes  [] No  Location: R Shoulder   Pain Rating: (0-10 scale) 0-1/10  Pain altered Tx:  [x] No  [] Yes  Action:  Comments:    Objective:  Modalities:   Work on right shoulder RTC/Scapular stabilizer strengthening. Posterior capsule stretching. Modalities as needed. 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   2-3 times a week for 4-6 weeks. Exercises:  Exercise  R Shoulder Reps/ Time Weight/ Level Comments    UT S 3x30\"     x   Levator S 3x30\"     x   Posterior capsule S 3x30\"     x   Doorway pec S 3x30\"     x                                               Wall slides 2x12, 10\"     x    UBE 5'      x    SL ER 3x10  1#    x    SL HAB  3x10     x              PRONE:           Retraction  2x10, 5\"     x    Depression 2x10, 5\"      x          T-band:       Rows 2x12 Blue  x   Ext 2x12 Blue  x   Other:        Treatment Charges: Mins Units   []  Modalities     [x]  Ther Exercise 34 2   []  Manual Therapy     []  Ther Activities     []  Aquatics     []  Vasocompression     []  Other     Total Treatment time 34 2       Assessment: [x] Progressing toward goals. Continued with stretches added at evaluation while also adding in exercises to start working on scapular and RTC strength. Pt tolerates most exercises well, only reporting pain with SL HAB.  Initially attempted exercise with 1#, however completed with no weight d/t pain. Pt reports fatigue in scapular stabilizers following band exercises, demonstrating good technique throughout. Pt reports general fatigue following treatment, but denies any increased pain in shoulder. Pt declines need for vaso today. Plan to assess response and progress pt as tolerate. [] No change. [] Other:  [x] Patient would continue to benefit from skilled physical therapy services in order to: Improve flexibility and strength to allow for better use of R arm    STG: (to be met in 6 treatments)  1. ? Pain: Pt to decrease pain levels to 2/10 with all activities to ease ADLs and use of R shoulder. 2. ? ROM: Increase AROM of R shoulder to 150* flexion/abduction and ER to T2 and IR to T9 to help improve functional ROM for self-care. 3. Independent with Home Exercise Programs     LTG: (to be met in 12 treatments)  1. Improve score of QuickDASH from 48% impaired to < 20% impaired to improve functional use of R arm for ADLs. 2. ? Strength: Improve R shoulder strength to 5/5 grossly to decrease need for compensatory techniques with ADLs. 3. Pt will demonstrate full, pain-free AROM of R arm in all planes to allow for normal use of shoulder without need for compensatory techniques. 4. Decrease pain to 0/10 with all activities     Patient goals: Improve strength    Pt. Education:  [x] Yes  [] No  [x] Reviewed Prior HEP/Ed  Method of Education: [x] Verbal  [x] Demo  [] Written  Comprehension of Education:  [x] Verbalizes understanding. [x] Demonstrates understanding. [x] Needs review. [] Demonstrates/verbalizes HEP/Ed previously given. Plan: [x] Continue current frequency toward long and short term goals. [x] Specific Instructions for subsequent treatments: Continue tx per POC      Time In: 5571            Time Out: 8804    Electronically signed by:   Samantha Quarles PT

## 2020-11-16 ENCOUNTER — HOSPITAL ENCOUNTER (OUTPATIENT)
Dept: PHYSICAL THERAPY | Age: 65
Setting detail: THERAPIES SERIES
Discharge: HOME | End: 2020-11-16
Payer: MEDICARE

## 2020-11-16 PROCEDURE — 97110 THERAPEUTIC EXERCISES: CPT

## 2020-11-16 NOTE — FLOWSHEET NOTE
[facility] Outpatient Physical Therapy              [address]              Phone: [phone]              Fax: [phone]      Physical Therapy Daily Treatment Note    Date:  2020  Patient Name:  Yas Dunn    :  1955  MRN: [id number]  Physician: Nadege Bass MD                              Insurance: Medicare  Medical Diagnosis:  Right rotator cuff tendinitis                   Rehab Codes: M75.81  Onset Date: 20               Next 's appt. : --  Visit# / total visits: 3/12   Cancels/No Shows: 0/0    Subjective:  Pt continues to report no pain when shoulder is at rest but with movement can be 5-6/10 pain immediately depending on move. Pain:  [x] Yes  [] No  Location: R Shoulder   Pain Rating: (0-10 scale) 0-1/10  Pain altered Tx:  [x] No  [] Yes  Action:  Comments:    Objective:  Modalities:   Work on right shoulder RTC/Scapular stabilizer strengthening. Posterior capsule stretching. Modalities as needed. 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   2-3 times a week for 4-6 weeks. Exercises:  Exercise  R Shoulder Reps/ Time Weight/ Level Comments    UT S 3x30\"     x   Levator S 3x30\"     x   Posterior capsule S 3x30\"     x   Doorway pec S 3x30\"     x                                               Wall slides 2x12, 10\"     x    UBE 5'      x    SL ER 3x10  1#    x    SL HAB 3x10     x    SL Shoulder Flex 3x10        PRONE:           Retraction  2x10, 5\"         Depression 2x10, 5\"                T-band:       Rows 2x12 Blue  x   Ext 2x12 Blue  x   Other:        Treatment Charges: Mins Units   []  Modalities     [x]  Ther Exercise 34 2   []  Manual Therapy     []  Ther Activities     []  Aquatics     []  Vasocompression     []  Other     Total Treatment time 34 2       Assessment: [x] Progressing toward goals.  Continued with most recent exercises with notable fatigue this date towards end of session along with increased pain symptoms reported especially with sidelying HAB. Patient declined vasocompression this date but strongly advised to ice at home and to remain consistent with ice in order to prevent excess swelling and pain. [] No change. [] Other:  [x] Patient would continue to benefit from skilled physical therapy services in order to: Improve flexibility and strength to allow for better use of R arm    STG: (to be met in 6 treatments)  1. ? Pain: Pt to decrease pain levels to 2/10 with all activities to ease ADLs and use of R shoulder. 2. ? ROM: Increase AROM of R shoulder to 150* flexion/abduction and ER to T2 and IR to T9 to help improve functional ROM for self-care. 3. Independent with Home Exercise Programs     LTG: (to be met in 12 treatments)  1. Improve score of QuickDASH from 48% impaired to < 20% impaired to improve functional use of R arm for ADLs. 2. ? Strength: Improve R shoulder strength to 5/5 grossly to decrease need for compensatory techniques with ADLs. 3. Pt will demonstrate full, pain-free AROM of R arm in all planes to allow for normal use of shoulder without need for compensatory techniques. 4. Decrease pain to 0/10 with all activities     Patient goals: Improve strength    Pt. Education:  [x] Yes  [] No  [x] Reviewed Prior HEP/Ed  Method of Education: [x] Verbal  [x] Demo  [] Written  Comprehension of Education:  [x] Verbalizes understanding. [x] Demonstrates understanding. [x] Needs review. [] Demonstrates/verbalizes HEP/Ed previously given. Plan: [x] Continue current frequency toward long and short term goals.     [x] Specific Instructions for subsequent treatments: Continue tx per POC      Time In: 1215pm            Time Out: 1255pm    Electronically signed by:  Shane Mota PTA

## 2020-11-19 ENCOUNTER — HOSPITAL ENCOUNTER (OUTPATIENT)
Dept: PHYSICAL THERAPY | Age: 65
Setting detail: THERAPIES SERIES
Discharge: HOME | End: 2020-11-19
Payer: MEDICARE

## 2020-11-19 PROCEDURE — 97110 THERAPEUTIC EXERCISES: CPT

## 2020-11-19 NOTE — FLOWSHEET NOTE
above with addition of wall walks and pulleys. Pt able to demonstrate better ROM with pulleys and wall walks compared to wall slides, so plan to continue with those two. With wall slides, pt reports weakness in her shoulder which limits her available ROM. Cueing for proper prone exercise technique given today with good carryover. Pt denies any increased pain following today's treatment. [] No change. [] Other:  [x] Patient would continue to benefit from skilled physical therapy services in order to: Improve flexibility and strength to allow for better use of R arm    STG: (to be met in 6 treatments)  1. ? Pain: Pt to decrease pain levels to 2/10 with all activities to ease ADLs and use of R shoulder. 2. ? ROM: Increase AROM of R shoulder to 150* flexion/abduction and ER to T2 and IR to T9 to help improve functional ROM for self-care. 3. Independent with Home Exercise Programs     LTG: (to be met in 12 treatments)  1. Improve score of QuickDASH from 48% impaired to < 20% impaired to improve functional use of R arm for ADLs. 2. ? Strength: Improve R shoulder strength to 5/5 grossly to decrease need for compensatory techniques with ADLs. 3. Pt will demonstrate full, pain-free AROM of R arm in all planes to allow for normal use of shoulder without need for compensatory techniques. 4. Decrease pain to 0/10 with all activities     Patient goals: Improve strength    Pt. Education:  [x] Yes  [] No  [x] Reviewed Prior HEP/Ed  Method of Education: [x] Verbal  [x] Demo  [] Written  Comprehension of Education:  [x] Verbalizes understanding. [x] Demonstrates understanding. [x] Needs review. [] Demonstrates/verbalizes HEP/Ed previously given. Plan: [x] Continue current frequency toward long and short term goals. [x] Specific Instructions for subsequent treatments: Continue tx per POC      Time In: 0933            Time Out: 3175    Electronically signed by:   Ankit Wahl PT

## 2020-11-24 ENCOUNTER — HOSPITAL ENCOUNTER (OUTPATIENT)
Dept: PHYSICAL THERAPY | Age: 65
Setting detail: THERAPIES SERIES
Discharge: HOME | End: 2020-11-24
Payer: MEDICARE

## 2020-11-24 PROCEDURE — 97140 MANUAL THERAPY 1/> REGIONS: CPT

## 2020-11-24 PROCEDURE — 97110 THERAPEUTIC EXERCISES: CPT

## 2020-11-24 NOTE — FLOWSHEET NOTE
[address] [place] [address] Outpatient Physical Therapy              [address] [place] [address] [address]              Phone: [phone]              Fax: [phone]      Physical Therapy Daily Treatment Note    Date:  2020  Patient Name:  Barbara Salvador    :  1955  MRN: [id number]  Physician: Richard Daugherty MD                              Insurance: Medicare  Medical Diagnosis:  Right rotator cuff tendinitis                   Rehab Codes: M75.81  Onset Date: 20               Next 's appt. : 21  Visit# / total visits: 5/12   Cancels/No Shows: 0/0    Subjective:    Pain:  [] Yes  [x] No  Location: R Shoulder   Pain Rating: (0-10 scale) 0/10  Pain altered Tx:  [x] No  [] Yes  Action:  Comments: : Pt presents to PT with no pain today. Feels some tenderness/soreness after her exercises and continues to just feel weak and mildly stiff. Objective:  Modalities:   Work on right shoulder RTC/Scapular stabilizer strengthening. Posterior capsule stretching. Modalities as needed. 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   2-3 times a week for 4-6 weeks.    Exercises:  Exercise  R Shoulder Reps/ Time Weight/ Level Comments    UT S 3x30\"        Levator S 3x30\"        Posterior capsule S 3x30\"        Doorway pec S 3x30\"     Defer to next visit                          Supine serratus punches 15 reps x 2   3'' pause at top x   Wall push-up plus  15 reps x 2    plus only (no elbow bend) x          Pulleys 5'   Defer to next visit   Wall walks 10x15\"      Wall slides w/red swiss ball 10 reps x2    1 set flexion, 1 set scaption x    UBE 5'      Defer to next visit    SL ER 15 reps x 2 1#    x    SL HAB 3x10         SL Shoulder Flex 3x10               PRONE:           Retraction 2x10, 5\"     x    Depression 2x10, 5\"      x          T-band:       Rows 2x15 Blue  x   Ext 2x12 Blue     Bilateral ER 10 reps x 3 Lime Seated x   Other: Manual therapy x 8 min  -Grade III inferior and posterior GH mobs  -STM to R upper trap, anterior deltoid and distal lat insertion        Treatment Charges: Mins Units   []  Modalities     [x]  Ther Exercise 33 2   []  Manual Therapy 8 1   []  Ther Activities     []  Aquatics     []  Vasocompression     []  Other     Total Treatment time 41 3       Assessment: [x] Progressing toward goals. [] No change. [] Other:  [x] Patient would continue to benefit from skilled physical therapy services in order to: Improve flexibility and strength to allow for better use of R arm    11/24: Good tolerance to all progressions today but continues to demonstrate weakness in R posterior cuff and scapular stabilizers. Also continues to demonstrate mild soft tissue restrictions limiting full available passive range which was addressed today. Lime resistance band dispensed to pt today for addition of bilateral seated ER. STG: (to be met in 6 treatments)  1. ? Pain: Pt to decrease pain levels to 2/10 with all activities to ease ADLs and use of R shoulder. 2. ? ROM: Increase AROM of R shoulder to 150* flexion/abduction and ER to T2 and IR to T9 to help improve functional ROM for self-care. 3. Independent with Home Exercise Programs     LTG: (to be met in 12 treatments)  1. Improve score of QuickDASH from 48% impaired to < 20% impaired to improve functional use of R arm for ADLs. 2. ? Strength: Improve R shoulder strength to 5/5 grossly to decrease need for compensatory techniques with ADLs. 3. Pt will demonstrate full, pain-free AROM of R arm in all planes to allow for normal use of shoulder without need for compensatory techniques. 4. Decrease pain to 0/10 with all activities     Patient goals: Improve strength    Pt. Education:  [x] Yes  [] No  [x] Reviewed Prior HEP/Ed  Method of Education: [x] Verbal  [x] Demo  [] Written  Comprehension of Education:  [x] Verbalizes understanding. [x] Demonstrates understanding. [x] Needs review. [] Demonstrates/verbalizes HEP/Ed previously given.      Plan: [x] Continue current frequency toward long and short term goals.     [x] Specific Instructions for subsequent treatments: Continue tx per POC      Time In: 8:02            Time Out: 8:43    Electronically signed by:  Deejay Bennett PT

## 2020-11-25 ENCOUNTER — HOSPITAL ENCOUNTER (OUTPATIENT)
Dept: PHYSICAL THERAPY | Age: 65
Setting detail: THERAPIES SERIES
Discharge: HOME | End: 2020-11-25
Payer: MEDICARE

## 2020-11-25 PROCEDURE — 97140 MANUAL THERAPY 1/> REGIONS: CPT

## 2020-11-25 PROCEDURE — 97112 NEUROMUSCULAR REEDUCATION: CPT

## 2020-11-25 PROCEDURE — 97110 THERAPEUTIC EXERCISES: CPT

## 2020-11-25 NOTE — FLOWSHEET NOTE
[address] [facility] Outpatient Physical Therapy              [address]              Phone: [phone]              Fax: [phone]      Physical Therapy Daily Treatment Note    Date:  2020  Patient Name:  Mingo Fernandez    :  1955  MRN: [id number]  Physician: Leanne Mary MD                              Insurance: Medicare  Medical Diagnosis:  Right rotator cuff tendinitis                   Rehab Codes: M75.81  Onset Date: 20               Next 's appt. : 21  Visit# / total visits:    Cancels/No Shows: 0/0    Subjective:    Pain:  [] Yes  [x] No  Location: R Shoulder   Pain Rating: (0-10 scale) 0/10  Pain altered Tx:  [x] No  [] Yes  Action:  Comments: : Denies any pain at start of visit and no significant soreness after additions from previous visit. Only reports mild tenderness and stiffness at the back of her R shoulder blade today. Objective:  Modalities:   Work on right shoulder RTC/Scapular stabilizer strengthening. Posterior capsule stretching. Modalities as needed. 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   2-3 times a week for 4-6 weeks.    Exercises:  Exercise  R Shoulder Reps/ Time Weight/ Level Comments    UT S 3x30\"        Levator S 3x30\"        Posterior capsule S 3x30\"        Doorway pec S 3x30\"         Supine shoulder flexion 10 reps x 2  1#  DB for passive overpressure at end range x    Sidelying shoulder abduction MWM 10 reps x 2   Facilitating scapular upward rotation and inferior GH glide x    Supine serratus punches 15 reps x 2   3'' pause at top    Wall push-up plus  15 reps x 2    plus only (no elbow bend)    Rhythmic stabilizations in supine; 0 deg 2'  Perturbations challenging ER/IR each; moderate resistance & speed x   Pulleys 5'   x   Wall walks 10x15\"      Wall slides w/red swiss ball 10 reps x2    1 set flexion, 1 set scaption x    UBE 6'    3' fwds/3' bwds x    SL ER 15 reps x 2 2#   x    SL HAB 3x10         SL Shoulder Flex 3x10               PRONE:           Retraction 2x10, 5\"         Depression 2x10, 5\"                T-band:       Rows 2x15 Blue     Ext 2x12 Blue     Bilateral ER 10 reps x 3 Lime Seated    Other: Manual therapy x 10'  -Grade III inferior and posterior GH mobs  -STM to R anterior deltoid, distal lat insertion and infraspinatus  -Lateral scapular glides in sidelying        Treatment Charges: Mins Units   []  Modalities     [x]  Ther Exercise 25 1   [x]  Manual Therapy 10 1   []  Ther Activities     [x]  Neuro Re-Ed 8 1   []  Vasocompression     []  Other     Total Treatment time 43 3       Assessment: [x] Progressing toward goals. [] No change. [] Other:  [x] Patient would continue to benefit from skilled physical therapy services in order to: Improve flexibility and strength to allow for better use of R arm    11/25: Good tolerance to session today with full flexion AROM noted in supine. Initiated sidelying MWMs to improve scapulohumeral rhythm/coordination with active abduction with improved range in this plane afterwards. Upright antigravity AROM continues to be limited due to weakness of RTC and scapular stabilizers and will continue to benefit from skilled PT intervention. STG: (to be met in 6 treatments)  1. ? Pain: Pt to decrease pain levels to 2/10 with all activities to ease ADLs and use of R shoulder. 2. ? ROM: Increase AROM of R shoulder to 150* flexion/abduction and ER to T2 and IR to T9 to help improve functional ROM for self-care. 3. Independent with Home Exercise Programs     LTG: (to be met in 12 treatments)  1. Improve score of QuickDASH from 48% impaired to < 20% impaired to improve functional use of R arm for ADLs. 2. ? Strength: Improve R shoulder strength to 5/5 grossly to decrease need for compensatory techniques with ADLs. 3. Pt will demonstrate full, pain-free AROM of R arm in all planes to allow for normal use of shoulder without need for compensatory techniques.   4. Decrease pain to 0/10 with all activities     Patient goals: Improve strength    Pt. Education:  [x] Yes  [] No  [x] Reviewed Prior HEP/Ed  Method of Education: [x] Verbal  [x] Demo  [] Written  Comprehension of Education:  [x] Verbalizes understanding. [x] Demonstrates understanding. [x] Needs review. [] Demonstrates/verbalizes HEP/Ed previously given. Plan: [x] Continue current frequency toward long and short term goals.     [x] Specific Instructions for subsequent treatments: Continue tx per POC      Time In:  9:25           Time Out: 10:08    Electronically signed by:  Jerilyn Tello, PT

## 2020-11-30 ENCOUNTER — HOSPITAL ENCOUNTER (OUTPATIENT)
Dept: PHYSICAL THERAPY | Age: 65
Setting detail: THERAPIES SERIES
Discharge: HOME | End: 2020-11-30
Payer: MEDICARE

## 2020-11-30 PROCEDURE — 97110 THERAPEUTIC EXERCISES: CPT

## 2020-11-30 PROCEDURE — 97140 MANUAL THERAPY 1/> REGIONS: CPT

## 2020-11-30 NOTE — FLOWSHEET NOTE
[address] Outpatient Physical Therapy              [address] [facility] [address]              Phone: [phone]              Fax: [phone]      Physical Therapy Daily Treatment Note    Date:  2020  Patient Name:  Clarita Mitchell    :  1955  MRN: [id number]  Physician: Basil Tang MD                              Insurance: Medicare  Medical Diagnosis:  Right rotator cuff tendinitis                   Rehab Codes: M75.81  Onset Date: 20               Next 's appt. : 21  Visit# / total visits:    Cancels/No Shows: 0/0    Subjective:    Pain:  [] Yes  [x] No  Location: R Shoulder   Pain Rating: (0-10 scale) 0/10  Pain altered Tx:  [x] No  [] Yes  Action:  Comments: : Pt states that she's doing well and has no pain or soreness at the moment. Felt fatigued after her previous visit but did not experience any pain. Objective:  Modalities:   Work on right shoulder RTC/Scapular stabilizer strengthening. Posterior capsule stretching. Modalities as needed. 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   2-3 times a week for 4-6 weeks.    Exercises:  Exercise  R Shoulder Reps/ Time Weight/ Level Comments Performed today= x   UT S 3x30\"        Levator S 3x30\"        Posterior capsule S 3x30\"        Doorway pec S 3x30\"         Supine shoulder flexion 10 reps x 2  1#  DB for passive overpressure at end range x    Sidelying shoulder abduction MWM 10 reps x 2   Facilitating scapular upward rotation and inferior GH glide for first set x    Supine serratus punches 15 reps x 2   3'' pause at top    Wall push-up plus  15 reps x 2    plus only (no elbow bend)    Rhythmic stabilizations in supine; 0 deg 2'  Perturbations challenging ER/IR each; moderate resistance & speed x   Pulleys 5'      Wall walks 10x15\"   x   Wall slides w/red swiss ball 10 reps x2    1 set flexion, 1 set scaption     UBE 6'    3' fwds/3' bwds x    SL ER 15 reps x 2 2#   x    SL HAB 3x10         SL Shoulder Flex 3x10               PRONE:           Retraction 2x10, 5\"        Ys, Ts, and Is 10 reps x 2    full available range x          T-band:       Rows 2x15 Blue     Ext 2x12 Blue     Bilateral ER 10 reps x 3 Lime Seated    Other: Manual therapy x 10'  -Grade III inferior and posterior GH mobs  -STM to R anterior deltoid, distal lat insertion and subscap  -Lateral scapular glides in sidelying        Treatment Charges: Mins Units   []  Modalities     [x]  Ther Exercise 34 2   [x]  Manual Therapy 10 1   []  Ther Activities     []  Neuro Re-Ed     []  Vasocompression     []  Other     Total Treatment time 44 3       Assessment: [x] Progressing toward goals. [] No change. [] Other:  [x] Patient would continue to benefit from skilled physical therapy services in order to: Improve flexibility and strength to allow for better use of R arm    11/30: Improved ease and efficiency with sidelying abduction raises with no pain and near full AROM in all planes. Good effort through PT session and verbalizes good compliance with home program. Initiated prone Ys, and Ts, and Is today which was modified to a reduced range due to fatigue/difficulty. Additional skilled strengthening and stabilization is required through newly achieved range. STG: (to be met in 6 treatments)  1. ? Pain: Pt to decrease pain levels to 2/10 with all activities to ease ADLs and use of R shoulder. 2. ? ROM: Increase AROM of R shoulder to 150* flexion/abduction and ER to T2 and IR to T9 to help improve functional ROM for self-care. 3. Independent with Home Exercise Programs     LTG: (to be met in 12 treatments)  1. Improve score of QuickDASH from 48% impaired to < 20% impaired to improve functional use of R arm for ADLs. 2. ? Strength: Improve R shoulder strength to 5/5 grossly to decrease need for compensatory techniques with ADLs.   3. Pt will demonstrate full, pain-free AROM of R arm in all planes to allow for normal use of shoulder without need for compensatory techniques. 4. Decrease pain to 0/10 with all activities     Patient goals: Improve strength    Pt. Education:  [x] Yes  [] No  [x] Reviewed Prior HEP/Ed  Method of Education: [x] Verbal  [x] Demo  [] Written  Comprehension of Education:  [x] Verbalizes understanding. [x] Demonstrates understanding. [x] Needs review. [] Demonstrates/verbalizes HEP/Ed previously given. Plan: [x] Continue current frequency toward long and short term goals.     [x] Specific Instructions for subsequent treatments: Continue tx per POC      Time In:  8:40am           Time Out: 9:24am     Electronically signed by:  Gerry Mc PT

## 2020-12-02 ENCOUNTER — HOSPITAL ENCOUNTER (OUTPATIENT)
Dept: PHYSICAL THERAPY | Age: 65
Setting detail: THERAPIES SERIES
Discharge: HOME | End: 2020-12-02
Payer: MEDICARE

## 2020-12-02 PROCEDURE — 97140 MANUAL THERAPY 1/> REGIONS: CPT

## 2020-12-02 PROCEDURE — 97110 THERAPEUTIC EXERCISES: CPT

## 2020-12-02 NOTE — FLOWSHEET NOTE
techniques with ADLs. 3. Pt will demonstrate full, pain-free AROM of R arm in all planes to allow for normal use of shoulder without need for compensatory techniques. 4. Decrease pain to 0/10 with all activities     Patient goals: Improve strength    Pt. Education:  [x] Yes  [] No  [x] Reviewed Prior HEP/Ed  Method of Education: [x] Verbal  [x] Demo  [] Written  Comprehension of Education:  [x] Verbalizes understanding. [x] Demonstrates understanding. [x] Needs review. [] Demonstrates/verbalizes HEP/Ed previously given. Plan: [x] Continue current frequency toward long and short term goals.     [x] Specific Instructions for subsequent treatments: Continue tx per POC      Time In: 8:45am            Time Out: 9:25am    Electronically signed by:  Ketty Mejia PT

## 2020-12-07 ENCOUNTER — HOSPITAL ENCOUNTER (OUTPATIENT)
Dept: PHYSICAL THERAPY | Age: 65
Setting detail: THERAPIES SERIES
Discharge: HOME | End: 2020-12-07
Payer: MEDICARE

## 2020-12-07 PROCEDURE — 97140 MANUAL THERAPY 1/> REGIONS: CPT

## 2020-12-07 PROCEDURE — 97110 THERAPEUTIC EXERCISES: CPT

## 2020-12-07 NOTE — FLOWSHEET NOTE
[address] [facility] Outpatient Physical Therapy              [address]              Phone: [phone]              Fax: [phone]      Physical Therapy Daily Treatment Note    Date:  2020  Patient Name:  Rupal Young    :  1955  MRN: [id number]  Physician: Heaven Srinivasan MD                              Insurance: Medicare  Medical Diagnosis:  Right rotator cuff tendinitis                   Rehab Codes: M75.81  Onset Date: 20               Next 's appt. : 21  Visit# / total visits:    Cancels/No Shows: 0/0    Subjective:    Pain:  [] Yes  [x] No  Location: R Shoulder   Pain Rating: (0-10 scale) 0/10  Pain altered Tx:  [x] No  [] Yes  Action:  Comments: : Pt reports more soreness in the top & back of her shoulder today but denies as pain, just tenderness likely from progressive resistance exercises    Objective:  Modalities:   Work on right shoulder RTC/Scapular stabilizer strengthening. Posterior capsule stretching. Modalities as needed. 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   2-3 times a week for 4-6 weeks. Exercises:  Exercise  R Shoulder Reps/ Time Weight/ Level Comments Performed today= x   Posterior capsule S 3x30\"        Doorway pec S 3x30\"         Supine shoulder flexion 10 reps x 2  1#  DB for passive overpressure at end range     Sidelying shoulder abduction MWM 10 reps x 2   Facilitating scapular upward rotation and inferior GH glide for first set     Supine serratus punches 15 reps x 2 2# 3'' pause at top.  R only    Wall push-up plus  15 reps x 2    plus only (no elbow bend) x   Rhythmic stabilizations in supine; 0 deg and 90 deg 2'  Perturbations challenging ER/IR each; moderate resistance & speed x   Pulleys 5'      Wall walks 10x15\"      Wall slides using red swiss/ball 15 reps x2    1 set flexion, 1 set scaption     UBE 6'    3' fwds/3' bwds x    SL ER 15 reps x 2 2#       SL HAB 3x10         SL Shoulder Flex 3x10        Behind the back towel stretch for R IR mobility 5'' hold x 15      Anterior delt raises to 90 deg 10 reps x 2      Lateral raises to 90 deg                     PRONE:           Retraction 2x10, 5\"        Ys, Ts, and Is 10 reps x 2    full available range x          T-band:       Rows 2x15 Blue     Ext 2x12 Blue     Bilateral ER 10 reps x 3 Lime Seated           Other: Manual therapy x 12'  -Grade III inferior and posterior GH mobs  -STM to R infraspinatus and levator scap  -Lateral scapular glides in sidelying        Treatment Charges: Mins Units   []  Modalities     [x]  Ther Exercise 28 2   [x]  Manual Therapy 12 1   []  Ther Activities     []  Neuro Re-Ed     []  Vasocompression     []  Other     Total Treatment time 40 3       Assessment: [x] Progressing toward goals. [] No change. [] Other:  [x] Patient would continue to benefit from skilled physical therapy services in order to: Improve flexibility and strength to allow for better use of R arm    12/7: More tightness & tenderness at R levator scap and infraspinatus today, requiring more manual techniques compared to previous visit to alleviate sx. Otherwise, reports minimal tenderness post manual intervention which she was able to maintain throughout remainder of today's session. Frequent verbal and tactile cues initially required for technique and to decrease lower back strain through her scap push ups on the wall and will require additional scapular re-education. STG: (to be met in 6 treatments)  1. ? Pain: Pt to decrease pain levels to 2/10 with all activities to ease ADLs and use of R shoulder. 2. ? ROM: Increase AROM of R shoulder to 150* flexion/abduction and ER to T2 and IR to T9 to help improve functional ROM for self-care. 3. Independent with Home Exercise Programs     LTG: (to be met in 12 treatments)  1. Improve score of QuickDASH from 48% impaired to < 20% impaired to improve functional use of R arm for ADLs.   2. ? Strength: Improve R shoulder strength to 5/5 grossly to decrease need for compensatory techniques with ADLs. 3. Pt will demonstrate full, pain-free AROM of R arm in all planes to allow for normal use of shoulder without need for compensatory techniques. 4. Decrease pain to 0/10 with all activities     Patient goals: Improve strength    Pt. Education:  [x] Yes  [] No  [x] Reviewed Prior HEP/Ed  Method of Education: [x] Verbal  [x] Demo  [] Written  Comprehension of Education:  [x] Verbalizes understanding. [x] Demonstrates understanding. [x] Needs review. [] Demonstrates/verbalizes HEP/Ed previously given. Plan: [x] Continue current frequency toward long and short term goals.     [x] Specific Instructions for subsequent treatments: Continue tx per POC      Time In: 8:02am            Time Out: 8:42am    Electronically signed by:  Emma Casey PT

## 2020-12-09 ENCOUNTER — HOSPITAL ENCOUNTER (OUTPATIENT)
Dept: PHYSICAL THERAPY | Age: 65
Setting detail: THERAPIES SERIES
Discharge: HOME | End: 2020-12-09
Payer: MEDICARE

## 2020-12-09 PROCEDURE — 97110 THERAPEUTIC EXERCISES: CPT

## 2020-12-09 PROCEDURE — 97140 MANUAL THERAPY 1/> REGIONS: CPT

## 2020-12-09 NOTE — FLOWSHEET NOTE
[facility] Outpatient Physical Therapy              [address] [facility] [address]              Phone: [phone]              Fax: [phone]      Physical Therapy Daily Treatment Note    Date:  2020  Patient Name:  Safia Gilbert    :  1955  MRN: [id number]  Physician: Brandon Aggarwal MD                              Insurance: Medicare  Medical Diagnosis:  Right rotator cuff tendinitis                   Rehab Codes: M75.81  Onset Date: 20               Next 's appt. : 21  Visit# / total visits: 10/12   Cancels/No Shows: 0/0    Subjective:    Pain:  [] Yes  [x] No  Location: R Shoulder   Pain Rating: (0-10 scale) 0/10  Pain altered Tx:  [x] No  [] Yes  Action:  Comments: : Pt states that she's doing well with less soreness in her R shoulder. Only current complaint is a chronic headache that she attributes to an incorrect contact lense prescription. Objective:  Modalities:   Work on right shoulder RTC/Scapular stabilizer strengthening. Posterior capsule stretching. Modalities as needed. 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   2-3 times a week for 4-6 weeks. Exercises:  Exercise  R Shoulder Reps/ Time Weight/ Level Comments Performed today= x   Posterior capsule S 3x30\"        Doorway pec S 3x30\"         Supine shoulder flexion 10 reps x 2  2#  DB for passive overpressure at end range x    Sidelying shoulder abduction MWM 10 reps x 2   Facilitating scapular upward rotation and inferior GH glide for first set x    Supine serratus punches 15 reps x 2 2# 3'' pause at top.  R only x   Wall push-up plus  15 reps x 2    plus only (no elbow bend)    Rhythmic stabilizations in supine; 0 deg and 90 deg 2'  Perturbations challenging ER/IR each; moderate resistance & speed    Alternating isometrics to R shoulder in supine   Mild manual perturbations at 90 deg and 140 deg x   Pulleys 5'      Wall walks 10x15\"      Wall slides using red swiss/ball 15 reps x2    1 set flexion, 1 set scaption     UBE 6'    3' fwds/3' bwds x    SL ER 15 reps x 2 2#       SL HAB 3x10         SL Shoulder Flex 3x10        Behind the back towel stretch for R IR mobility 5'' hold x 15      Anterior delt raises to 90 deg 10 reps x 2 1# Bilateral; seated x   Lateral raises to 90 deg 10 reps x 2 1# Bilateral; seated x                 PRONE:           Retraction 2x10, 5\"        Ys, Ts, and Is 10 reps x 3    full available range x          T-band:       Rows 2x15 Blue     Ext 2x12 Blue     Bilateral ER 10 reps x 2 Lime Seated x   Bilateral horizontal abduction 10 reps x 2 Limed Seated x   Other: Manual therapy x 10'  -Grade III inferior and posterior GH mobs  -STM to R distal lat insertion       Treatment Charges: Mins Units   []  Modalities     [x]  Ther Exercise 30 2   [x]  Manual Therapy 10 1   []  Ther Activities     []  Neuro Re-Ed     []  Vasocompression     []  Other     Total Treatment time 40 3       Assessment: [x] Progressing toward goals. [] No change. [] Other:  [x] Patient would continue to benefit from skilled physical therapy services in order to: Improve flexibility and strength to allow for better use of R arm    12/9: Sx continue to improve with less soreness reported post PT sessions. Gradually progressing weighted & banded resistance with stabilization activities with mild fatigue but no increased pain. Progress note due in 2 visits for formal reassessment of progress towards goals. STG: (to be met in 6 treatments)  1. ? Pain: Pt to decrease pain levels to 2/10 with all activities to ease ADLs and use of R shoulder. 2. ? ROM: Increase AROM of R shoulder to 150* flexion/abduction and ER to T2 and IR to T9 to help improve functional ROM for self-care. 3. Independent with Home Exercise Programs     LTG: (to be met in 12 treatments)  1. Improve score of QuickDASH from 48% impaired to < 20% impaired to improve functional use of R arm for ADLs.   2. ? Strength: Improve R shoulder strength to 5/5 grossly to decrease need for compensatory techniques with ADLs. 3. Pt will demonstrate full, pain-free AROM of R arm in all planes to allow for normal use of shoulder without need for compensatory techniques. 4. Decrease pain to 0/10 with all activities     Patient goals: Improve strength    Pt. Education:  [x] Yes  [] No  [x] Reviewed Prior HEP/Ed  Method of Education: [x] Verbal  [x] Demo  [] Written  Comprehension of Education:  [x] Verbalizes understanding. [x] Demonstrates understanding. [x] Needs review. [] Demonstrates/verbalizes HEP/Ed previously given. Plan: [x] Continue current frequency toward long and short term goals.     [x] Specific Instructions for subsequent treatments: Continue tx per POC      Time In: 8:01am            Time Out: 8:41am    Electronically signed by:  Helga Dawson PT

## 2020-12-15 PROBLEM — R73.9 HYPERGLYCEMIA: Status: ACTIVE | Noted: 2020-12-15

## 2020-12-15 PROBLEM — Z53.20 MAMMOGRAM DECLINED: Status: ACTIVE | Noted: 2020-12-15

## 2020-12-15 PROBLEM — E55.9 VITAMIN D DEFICIENCY: Status: ACTIVE | Noted: 2020-12-15

## 2020-12-16 ENCOUNTER — HOSPITAL ENCOUNTER (OUTPATIENT)
Dept: PHYSICAL THERAPY | Age: 65
Setting detail: THERAPIES SERIES
Discharge: HOME | End: 2020-12-16
Payer: MEDICARE

## 2020-12-16 PROCEDURE — 97110 THERAPEUTIC EXERCISES: CPT

## 2020-12-16 PROCEDURE — 97140 MANUAL THERAPY 1/> REGIONS: CPT

## 2020-12-16 NOTE — PROGRESS NOTES
[facility] Outpatient Physical Therapy              [address]              Phone: [phone]              Fax: [phone]      Physical Therapy Progress Note & Daily Treatment     Date:  2020  Patient Name:  Yas Dunn    :  1955  MRN: [id number]  Physician: Nadege Bass MD                                Insurance: Medicare  Medical Diagnosis:  Right rotator cuff tendinitis                     Rehab Codes: M75.81  Onset Date: 20                 Next 's appt. : 21  Visit# / total visits:    Cancels/No Shows: 0/0    Subjective:    Pain:  [] Yes  [x] No  Location: R Shoulder   Pain Rating: (0-10 scale) 0/10  Pain altered Tx:  [x] No  [] Yes  Action:  Comments: : Pt states that she feels like she's doing much better. Pain has been minimal and just experiences fatigue after her exercises. Feels like her mobility and strength is much better and that she feels about 75% back to her normal with use of her R arm. QuickDASH Score : 40% impaired    Objective:    *Below objectives assessed     Range of Motion  Left Range of Motion  Right Strength  Left Strength  Right   Shoulder Flexion 160 145 4+/5 4/5   Shoulder Abduction 170 160 4+/5 4/5   Shoulder Extension 70 60     Shoulder ER 80 70 4+/5 4/5   Shoulder IR 70 70 5/5 4+/5   Elbow Flexion   5/5 4+/5   Elbow Extension   4+/5 4/5   Pronation       Supination       Wrist Flexion   5/5 5/5   Wrist Extension   5/5 5/5   Apley- Cross Body Posterior shoulder Posterior shoulder n/a n/a   Apley- Gross ER T2 T2 n/a n/a   Apley- Gross IR T7 T10 n/a n/a         Modalities:   Work on right shoulder RTC/Scapular stabilizer strengthening. Posterior capsule stretching. Modalities as needed. 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   2-3 times a week for 4-6 weeks.    Exercises:  Exercise  R Shoulder Reps/ Time Weight/ Level Comments Performed today= x   Posterior capsule S 3x30\"        Doorway pec S 3x30\"         Supine shoulder flexion 10 reps x 2  2#  DB for passive overpressure at end range     Sidelying shoulder abduction  10 reps x 2  1# MWM- facilitating scapular upward rotation and inferior GH glide for first set     Supine serratus punches 15 reps x 2 2# 3'' pause at top. R only    Wall push-up plus  15 reps x 2    plus only (no elbow bend)    Rhythmic stabilizations in supine; 0 deg and 90 deg 2'  Perturbations challenging ER/IR each; moderate resistance & speed    Alternating isometrics to R shoulder in supine   Mild manual perturbations at 90 deg and 140 deg    Pulleys 5'      Wall walks 10x15\"      Wall slides using red swiss/ball 15 reps x2    1 set flexion, 1 set scaption     UBE 6'    3' fwds/3' bwds Resume next visit    SL ER 15 reps x 2 2#       SL HAB 3x10         SL Shoulder Flex 3x10        Behind the back towel stretch for R IR mobility 5'' hold x 15      Anterior delt raises to 90 deg 10 reps x 2 1# Bilateral; seated x   Lateral raises to 90 deg 10 reps x 2 1# Bilateral; seated x   B shoulder press 10 reps x 2 1# Bilateral; seated x   Ball on wall 30 reps up/down, L/R, circles 2# 2 sets @90 deg flex  2 sets @90 deg abd Resume next visit   Supine D2 PNF  10 x 2  Mild manual resistance  x          PRONE:           Retraction 2x10, 5\"        Ys, Ts, and Is 10 reps x 3  1#  full available range           T-band:       Rows 2x15 Blue     Ext 2x12 Blue     Bilateral ER 10 reps x 2 Lime Seated Resume next visit   Bilateral horizontal abduction 8 reps x 3 Lime Seated Resume next visit   Other: Manual therapy x 8'  -Grade III inferior and posterior GH mobs  -STM to R distal lat insertion     Subjective & Objective Reassessment- billed as Therex      Treatment Charges: Mins Units   []  Modalities     [x]  Ther Exercise 34 2   [x]  Manual Therapy 8 1   []  Ther Activities     []  Neuro Re-Ed     []  Vasocompression     []  Other     Total Treatment time 42 3       Assessment: [x] Progressing toward goals. [] No change. [] Other:  [x] Patient would continue to benefit from skilled physical therapy services in order to: Improve flexibility and strength to allow for better use of R arm    Pt has been seen for 12 PT visits to date for her R shoulder pain & stiffness. Overall, demonstrates excellent initial progress to date with regard to R shoulder AROM and strength with regard to all objective measures re-assessed today. Denies any recent pain and reports improving function using her R dominant arm for ADLs. However, continues to demonstrate mild ROM limitations in all planes and moderate residual weakness. Excellent compliance with HEP issued to date but will continue to benefit from skilled PT intervention as pt still has difficulty reaching into tall cabinets and using her R arm to lift household items. Will extend current POC x 6 additional visits with excellent prognosis to meet remaining goals in this time frame. STG: (to be met in 6 treatments)  1. ? Pain: Pt to decrease pain levels to 2/10 with all activities to ease ADLs and use of R shoulder. GOAL MET 12/16  2. ? ROM: Increase AROM of R shoulder to 160* flexion/abduction and ER to T2 and IR to T9 to help improve functional ROM for self-care. GOAL PROGRESSING & MODIFIED 12/16  3. Independent with Home Exercise Programs GOOD COMPLIANCE TO DATE; EXTENDING GOAL THROUGH REMAINDER OF POC     LTG: (to be met in 12 treatments)  1. Improve score of QuickDASH from 48% impaired to < 20% impaired to improve functional use of R arm for ADLs. GOAL PROGRESSING 12/16  2. ? Strength: Improve R shoulder strength to 5/5 grossly to decrease need for compensatory techniques with ADLs. GOAL PROGRESSING 12/16  3. Pt will demonstrate full, pain-free AROM of R arm in all planes to allow for normal use of shoulder without need for compensatory techniques. GOAL PROGRESSING 12/16  4.  Decrease pain to 0/10 with all activities; GOAL MET 12/16     Patient goals: Improve strength      Treatment Plan:  [x] Therapeutic Exercise   87013  [] Iontophoresis: 4 mg/mL Dexamethasone Sodium Phosphate  mAmin  94765   [x] Therapeutic Activity  76895 [x] Vasopneumatic cold with compression  31915    [] Gait Training   40198 [] Ultrasound   79976   [x] Neuromuscular Re-education  29813 [] Electrical Stimulation Unattended  13973   [x] Manual Therapy  45243 [] Electrical Stimulation Attended  44863   [] Instruction in HEP  [] Lumbar/Cervical Traction  70945   [] Aquatic Therapy   26193 [] Cold/hotpack    [] Massage   35351      [] Dry Needling, 1 or 2 muscles  57070   [] Biofeedback, first 15 minutes   04147  [] Biofeedback, additional 15 minutes   09557 [] Dry Needling, 3 or more muscles  74515      Patient Status:     [] Continue per initial plan of care. [x] Additional visits necessary. [] Other:     Requested Frequency/Duration:  2 times per week for 6 additional treatments. Pt. Education:  [x] Yes  [] No  [x] Reviewed Prior HEP/Ed  Method of Education: [x] Verbal  [x] Demo  [] Written  Comprehension of Education:  [x] Verbalizes understanding. [x] Demonstrates understanding. [x] Needs review. [x] Demonstrates/verbalizes HEP/Ed previously given.          Time In: 8:02am            Time Out: 9:44am    Electronically signed by:  Queenie Rodriguez PT        Physician Signature:________________________________Date:__________________  By signing above or cosigning this note, I have reviewed this plan of care and certify a need for medically necessary rehabilitation services.      *PLEASE SIGN ABOVE AND FAX BACK ALL PAGES*

## 2020-12-21 ENCOUNTER — HOSPITAL ENCOUNTER (OUTPATIENT)
Dept: PHYSICAL THERAPY | Age: 65
Setting detail: THERAPIES SERIES
Discharge: HOME | End: 2020-12-21
Payer: MEDICARE

## 2020-12-21 PROCEDURE — 97110 THERAPEUTIC EXERCISES: CPT

## 2020-12-21 PROCEDURE — 97140 MANUAL THERAPY 1/> REGIONS: CPT

## 2020-12-21 NOTE — FLOWSHEET NOTE
[address] [place] [address] Outpatient Physical Therapy              [address] [place] [address] [address]              Phone: [phone]              Fax: [phone]      Physical Therapy Daily Treatment Note    Date:  2020  Patient Name:  Rosie Cross    :  1955  MRN: [id number]  Physician: Beverly Gutierrez MD                              Insurance: Medicare  Medical Diagnosis:  Right rotator cuff tendinitis                   Rehab Codes: M75.81  Onset Date: 20               Next 's appt. : 21  Visit# / total visits:    Cancels/No Shows: 0/0    Subjective:    Pain:  [] Yes  [x] No  Location: R Shoulder   Pain Rating: (0-10 scale) 0/10  Pain altered Tx:  [x] No  [] Yes  Action:  Comments: : Pt states that she's doing well. Felt fatigued after her previous visit but otherwise feels better each week. Objective:  Modalities:   Work on right shoulder RTC/Scapular stabilizer strengthening. Posterior capsule stretching. Modalities as needed. 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   2-3 times a week for 4-6 weeks. Exercises:  Exercise  R Shoulder Reps/ Time Weight/ Level Comments Performed today= x   Posterior capsule S 3x30\"        Doorway pec S 3x30\"         Supine shoulder flexion 10 reps x 2  2#  DB for passive overpressure at end range     Sidelying shoulder abduction  10 reps x 2  1# MWM- facilitating scapular upward rotation and inferior GH glide for first set     Supine serratus punches 15 reps x 2 2# 3'' pause at top.  R only    Wall push-up plus  15 reps x 2    plus only (no elbow bend)    Rhythmic stabilizations in supine; 0 deg and 90 deg 2'  Perturbations challenging ER/IR each; moderate resistance & speed    Alternating isometrics to R shoulder in supine   Mild manual perturbations at 90 deg and 140 deg    Pulleys 5'      Wall walks 10x15\"      Wall slides using red swiss/ball 15 reps x2    1 set flexion, 1 set scaption     UBE 6'    3' fwds/3' bwds x    SL ER 15 reps x 2 2#    SL HAB 3x10         SL Shoulder Flex 3x10        Behind the back towel stretch for R IR mobility 5'' hold x 15      Anterior delt raises to 90 deg 10 reps x 2 1# Bilateral; standing x   Lateral raises to 90 deg 10 reps x 2 1# Bilateral; standing x   B shoulder press 10 reps x 2 1# Bilateral; standing against wall x   Ball on wall 20 reps up/down, L/R, circles 2# 2 sets @90 deg flex  2 sets @90 deg abd x          PRONE:           Retraction 2x10, 5\"        Ys, Ts, and Is 10 reps x 3  1#  full available range x          T-band:       Rows 2x15 Blue     Ext 2x12 Blue     Bilateral ER 10 reps x 2 Lime Seated    Bilateral horizontal abduction 8 reps x 3 Lime Seated    Banded scapular clocks 30 reps x 4 Lime Reaching to 11:00 and 1:00 x   Other: Manual therapy x 8'  -Grade III inferior and posterior GH mobs  -STM to R distal lat insertion       Treatment Charges: Mins Units   []  Modalities     [x]  Ther Exercise 33 2   [x]  Manual Therapy 8 1   []  Ther Activities     []  Neuro Re-Ed     []  Vasocompression     []  Other     Total Treatment time 41 3       Assessment: [x] Progressing toward goals. [] No change. [] Other:  [x] Patient would continue to benefit from skilled physical therapy services in order to: Improve flexibility and strength to allow for better use of R arm    12/21: Pt is progressing well towards goals showing less resistance at end range elevation and improving scapulohumeral mechanics. Still demonstrates mild compensation with bilateral overhead DB presses due to fatigue/weakness in R shoulder and corrected with tactile feedback against wall. STG: (to be met in 6 treatments)  1. ? Pain: Pt to decrease pain levels to 2/10 with all activities to ease ADLs and use of R shoulder. 2. ? ROM: Increase AROM of R shoulder to 150* flexion/abduction and ER to T2 and IR to T9 to help improve functional ROM for self-care.   3. Independent with Home Exercise Programs     LTG: (to be met in 12

## 2020-12-23 ENCOUNTER — HOSPITAL ENCOUNTER (OUTPATIENT)
Dept: PHYSICAL THERAPY | Age: 65
Setting detail: THERAPIES SERIES
Discharge: HOME | End: 2020-12-23
Payer: MEDICARE

## 2020-12-23 PROCEDURE — 97110 THERAPEUTIC EXERCISES: CPT

## 2020-12-23 PROCEDURE — 97140 MANUAL THERAPY 1/> REGIONS: CPT

## 2020-12-23 NOTE — FLOWSHEET NOTE
[address] Outpatient Physical Therapy              [address] [facility] [address]              Phone: [phone]              Fax: [phone]      Physical Therapy Daily Treatment Note    Date:  2020  Patient Name:  Dorian Smith    :  1955  MRN: [id number]  Physician: Sheri Lesch, MD                              Insurance: Medicare  Medical Diagnosis:  Right rotator cuff tendinitis       Rehab Codes: M75.81  Onset Date: 20                  Next 's appt. : 21  Visit# / total visits:     Cancels/No Shows: 0/0    Subjective:    Pain:  [] Yes  [x] No  Location: R Shoulder   Pain Rating: (0-10 scale) 0/10  Pain altered Tx:  [x] No  [] Yes  Action:  Comments: : Pt states that she felt a little sore after her last visit around her upper trap but feels mostly better today. Still feels a little bit tight but otherwise no pain. Objective:  Modalities:   Work on right shoulder RTC/Scapular stabilizer strengthening. Posterior capsule stretching. Modalities as needed. 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   2-3 times a week for 4-6 weeks. Exercises:  Exercise  R Shoulder Reps/ Time Weight/ Level Comments Performed today= x   Posterior capsule S 3x30\"        Doorway pec S 3x30\"         Supine shoulder flexion 10 reps x 2  2#  DB for passive overpressure at end range     Sidelying shoulder abduction  10 reps x 2  1# MWM- facilitating scapular upward rotation and inferior GH glide for first set     Supine serratus punches 15 reps x 2 2# 3'' pause at top.  R only    Wall push-up plus  15 reps x 2    plus only (no elbow bend)    Rhythmic stabilizations in supine; 0 deg and 90 deg 2'  Perturbations challenging ER/IR each; moderate resistance & speed    Alternating isometrics to R shoulder in supine   Mild manual perturbations at 90 deg and 140 deg    Pulleys 5'      Wall walks 10x15\"      Wall slides using red swiss/ball 15 reps x2    1 set flexion, 1 set scaption     UBE 6'    3' fwds/3' bwds x    SL ER 15 reps x 2    x    SL HAB 3x10         SL Shoulder Flex 3x10        Behind the back towel stretch for R IR mobility 5'' hold x 15      Anterior delt raises to 90 deg 10 reps x 2 1# Bilateral; standing    Lateral raises to 90 deg 10 reps x 2 1# Bilateral; standing    B shoulder press 10 reps x 2 1# Bilateral; standing against wall    Ball on wall 20 reps up/down, L/R, circles 2# 2 sets @90 deg flex  2 sets @90 deg abd x   R upper trap stretch 30'' x 3   x          PRONE:           Retraction 2x10, 5\"        Ys, Ts, and Is 10 reps x 3    full available range x          T-band:       Rows 2x15 Blue     Ext 15 reps x 3 Green tubing  x   ER at 0 deg 15 reps x 2 Green tubing  x   IR at 0 deg 15 reps x 2 Green tubing  x   Bilateral horizontal abduction 8 reps x 3 Lime Seated    Banded scapular clocks 30 reps x 4 Lime Reaching to 11:00 and 1:00    Other: Manual therapy x 10'  -Grade III inferior and posterior GH mobs  -STM to R distal lat insertion and upper trap  -Manual R upper trap stretch      Treatment Charges: Mins Units   []  Modalities     [x]  Ther Exercise 30 2   [x]  Manual Therapy 10 1   []  Ther Activities     []  Neuro Re-Ed     []  Vasocompression     []  Other     Total Treatment time 40 3       Assessment: [x] Progressing toward goals. [] No change. [] Other:  [x] Patient would continue to benefit from skilled physical therapy services in order to: Improve flexibility and strength to allow for better use of R arm    12/23: Mild tightness and tender trigger points along R upper trap today, likely secondary to compensatory use with fatigue during strengthening progressions last visit. Responded well to manual intervention and reviewed stretching techniques. Held overhead resistive activities, focusing primarily on RTC stabilization below shoulder level without UE compensation.  Will follow up in 1 week as pt requested to cancel next week's visits due to conflicts with family. STG: (to be met in 6 treatments)  1. ? Pain: Pt to decrease pain levels to 2/10 with all activities to ease ADLs and use of R shoulder. 2. ? ROM: Increase AROM of R shoulder to 150* flexion/abduction and ER to T2 and IR to T9 to help improve functional ROM for self-care. 3. Independent with Home Exercise Programs     LTG: (to be met in 12 treatments)  1. Improve score of QuickDASH from 48% impaired to < 20% impaired to improve functional use of R arm for ADLs. 2. ? Strength: Improve R shoulder strength to 5/5 grossly to decrease need for compensatory techniques with ADLs. 3. Pt will demonstrate full, pain-free AROM of R arm in all planes to allow for normal use of shoulder without need for compensatory techniques. 4. Decrease pain to 0/10 with all activities     Patient goals: Improve strength    Pt. Education:  [x] Yes  [] No  [x] Reviewed Prior HEP/Ed  Method of Education: [x] Verbal  [x] Demo  [] Written  Comprehension of Education:  [x] Verbalizes understanding. [x] Demonstrates understanding. [x] Needs review. [] Demonstrates/verbalizes HEP/Ed previously given. Plan: [x] Continue current frequency toward long and short term goals.     [x] Specific Instructions for subsequent treatments: Progress note next visit      Time In: 8:00am            Time Out: 8:40am    Electronically signed by:  Miguelina Antonio PT

## 2020-12-24 ENCOUNTER — HOSPITAL ENCOUNTER (OUTPATIENT)
Dept: WOMENS IMAGING | Age: 65
Discharge: HOME | End: 2020-12-26
Payer: MEDICARE

## 2020-12-24 PROCEDURE — 77080 DXA BONE DENSITY AXIAL: CPT

## 2020-12-28 ENCOUNTER — APPOINTMENT (OUTPATIENT)
Dept: PHYSICAL THERAPY | Age: 65
End: 2020-12-28
Payer: MEDICARE

## 2020-12-30 ENCOUNTER — APPOINTMENT (OUTPATIENT)
Dept: PHYSICAL THERAPY | Age: 65
End: 2020-12-30
Payer: MEDICARE

## 2021-01-04 ENCOUNTER — HOSPITAL ENCOUNTER (OUTPATIENT)
Dept: PHYSICAL THERAPY | Age: 66
Setting detail: THERAPIES SERIES
Discharge: HOME | End: 2021-01-04
Payer: MEDICARE

## 2021-01-04 PROCEDURE — 97140 MANUAL THERAPY 1/> REGIONS: CPT

## 2021-01-04 PROCEDURE — 97112 NEUROMUSCULAR REEDUCATION: CPT

## 2021-01-04 PROCEDURE — 97110 THERAPEUTIC EXERCISES: CPT

## 2021-01-04 NOTE — FLOWSHEET NOTE
[address] [place] [address] Outpatient Physical Therapy              [address] [place] [address] [address]              Phone: [phone]              Fax: [phone]      Physical Therapy Daily Treatment Note    Date:  2021  Patient Name:  Jae Rai    :  1955  MRN: [id number]  Physician: Chris Villanueva MD                              Insurance: Medicare  Medical Diagnosis:  Right rotator cuff tendinitis       Rehab Codes: M75.81  Onset Date: 20                  Next 's appt. : 21  Visit# / total visits: 15/18    Cancels/No Shows: 0/0    Subjective:    Pain:  [] Yes  [x] No  Location: R Shoulder   Pain Rating: (0-10 scale) 0/10  Pain altered Tx:  [x] No  [] Yes  Action:  Comments: : Pt verbalizes good compliance with her HEP over holidays and states that her shoulder has been feeling very good with minimal stiffness and no recent pain. Objective:  Modalities:   Work on right shoulder RTC/Scapular stabilizer strengthening. Posterior capsule stretching. Modalities as needed. 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   2-3 times a week for 4-6 weeks. Exercises:  Exercise  R Shoulder Reps/ Time Weight/ Level Comments Performed today= x   Posterior capsule S 3x30\"        Doorway pec S 3x30\"         Supine shoulder flexion 10 reps x 2  2#  DB for passive overpressure at end range     Sidelying shoulder abduction  10 reps x 2  1# MWM- facilitating scapular upward rotation and inferior GH glide for first set     Supine serratus punches 15 reps x 2 2# 3'' pause at top.  R only    Tabletop push up plus  15 reps x 2    plus only (no elbow bend) x   Rhythmic stabilizations in supine; 0 deg and 90 deg 2'  Perturbations challenging ER/IR each; moderate resistance & speed    Alternating isometrics to R shoulder in supine   Mild manual perturbations at 90 deg and 140 deg    Pulleys 5'      Wall walks 10x15\"      Wall slides using red swiss/ball 15 reps x2    1 set flexion, 1 set scaption     UBE 6'    3' fwds/3' bwds x    SL ER 15 reps x 2        SL HAB 3x10         SL Shoulder Flex 3x10        Behind the back towel stretch for R IR mobility 5'' hold x 15      Anterior delt raises to 90 deg 10 reps x 3 1# Bilateral; standing x   Lateral raises to 90 deg 10 reps x 3 1# Bilateral; standing x   B shoulder press 10 reps x 3 1# Bilateral; standing against wall x   Ball on wall 20 reps up/down, L/R, circles 2# 2 sets @90 deg flex   x   R upper trap stretch 30'' x 3      Body blade at 0 deg 30'' x 3  ER/IR x          PRONE:           Retraction 2x10, 5\"        Ys, Ts, and Is 15 reps x 2    full available range x          T-band:       Rows 2x15 Blue     Ext 15 reps x 3 Green tubing     ER at 0 deg 15 reps x 2 Green tubing     IR at 0 deg 15 reps x 2 Green tubing     Bilateral horizontal abduction 8 reps x 3 Lime Seated    Banded scapular clocks 30 reps x 3 Lime Reaching to 10:00 and 2:00 x   Other: Manual therapy x 8'  -Grade III inferior and posterior GH mobs  -STM to R distal lat insertion      Treatment Charges: Mins Units   []  Modalities     [x]  Ther Exercise 24 1   [x]  Manual Therapy 8 1   []  Ther Activities     [x]  Neuro Re-Ed 8 1   []  Vasocompression     []  Other     Total Treatment time 40 3       Assessment: [x] Progressing toward goals. [] No change. [] Other:  [x] Patient would continue to benefit from skilled physical therapy services in order to: Improve flexibility and strength to allow for better use of R arm    1/4: Minimal stiffness at start of visit today and less need for manual therapy today. Mild fatigue still with all strengthening focused activities but overall excellent improvement in functional use of R shoulder. Progress note due at next visit with likely readiness to transition to an independent home exercise program at that time. STG: (to be met in 6 treatments)  1. ? Pain: Pt to decrease pain levels to 2/10 with all activities to ease ADLs and use of R shoulder.   2. ? ROM: Increase AROM of R shoulder to 150* flexion/abduction and ER to T2 and IR to T9 to help improve functional ROM for self-care. 3. Independent with Home Exercise Programs     LTG: (to be met in 12 treatments)  1. Improve score of QuickDASH from 48% impaired to < 20% impaired to improve functional use of R arm for ADLs. 2. ? Strength: Improve R shoulder strength to 5/5 grossly to decrease need for compensatory techniques with ADLs. 3. Pt will demonstrate full, pain-free AROM of R arm in all planes to allow for normal use of shoulder without need for compensatory techniques. 4. Decrease pain to 0/10 with all activities     Patient goals: Improve strength    Pt. Education:  [x] Yes  [] No  [x] Reviewed Prior HEP/Ed  Method of Education: [x] Verbal  [x] Demo  [] Written  Comprehension of Education:  [x] Verbalizes understanding. [x] Demonstrates understanding. [x] Needs review. [] Demonstrates/verbalizes HEP/Ed previously given. Plan: [x] Continue current frequency toward long and short term goals.     [x] Specific Instructions for subsequent treatments: Progress note next visit      Time In: 8:04am            Time Out: 8:44am    Electronically signed by:  Percy Hooper, PT

## 2021-01-06 ENCOUNTER — HOSPITAL ENCOUNTER (OUTPATIENT)
Dept: PHYSICAL THERAPY | Age: 66
Setting detail: THERAPIES SERIES
Discharge: HOME | End: 2021-01-06
Payer: MEDICARE

## 2021-01-06 PROCEDURE — 97530 THERAPEUTIC ACTIVITIES: CPT

## 2021-01-06 PROCEDURE — 97110 THERAPEUTIC EXERCISES: CPT

## 2021-01-06 NOTE — DISCHARGE SUMMARY
[facility] Outpatient Physical Therapy              [facility]              Phone: [phone]              Fax: [phone]      Physical Therapy Progress Note & Discharge Summary    Date:  2021  Patient Name:  Delaney Prieto    :  1955  MRN: [id number]  Physician: Chris Roy MD                              Insurance: Medicare  Medical Diagnosis:  Right rotator cuff tendinitis       Rehab Codes: M75.81  Onset Date: 20                  Next 's appt. : 21  Visit# / total visits:     Cancels/No Shows: 0/0    Subjective:    Pain:  [] Yes  [x] No  Location: R Shoulder   Pain Rating: (0-10 scale) 0/10  Pain altered Tx:  [x] No  [] Yes  Action:  Comments: : Pt states that she's doing very well with regard to her R shoulder. Denies any recent pain or particular difficulty with any reaching ADLs. Feels like she's also gotten a lot stronger since starting PT and feels ready to finish up to an independent home program today. QuickDASH score (): 9% impaired    Objective:      Range of Motion  Left Range of Motion  Right Strength  Left Strength  Right   Shoulder Flexion 160 160 4/5 4/5   Shoulder Abduction 165 165 4+/5 4+/5   Shoulder Extension 70 70     Shoulder ER 75 75 4/5 4/5   Shoulder IR 70 70 4+/5 4+/5   Elbow Flexion   5/5 5/5   Elbow Extension   4+/5 4+/5   Pronation       Supination       Wrist Flexion       Wrist Extension       Apley- Cross Body Posterior shoulder Posterior shoulder --- ---   Kalen Lacy ER T2 T2 --- ---   Apley- Gross IR T5 T7 --- ---         Modalities:   Work on right shoulder RTC/Scapular stabilizer strengthening. Posterior capsule stretching. Modalities as needed. 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   2-3 times a week for 4-6 weeks.    Exercises:  Exercise  R Shoulder Reps/ Time Weight/ Level Comments Performed today= x   Posterior capsule S 3x30\"        Doorway pec S 3x30\"         Supine shoulder flexion 10 reps x 2  2#  DB for passive overpressure at end range     Sidelying shoulder abduction  10 reps x 2  1# MWM- facilitating scapular upward rotation and inferior GH glide for first set     Supine serratus punches 15 reps x 2 2# 3'' pause at top. R only    Tabletop push up plus  15 reps x 2    plus only (no elbow bend)    Rhythmic stabilizations in supine; 0 deg and 90 deg 2'  Perturbations challenging ER/IR each; moderate resistance & speed    Alternating isometrics to R shoulder in supine   Mild manual perturbations at 90 deg and 140 deg    Pulleys 5'      Wall walks 10x15\"      Wall slides using red swiss/ball 15 reps x2    1 set flexion, 1 set scaption     UBE 6'    3' fwds/3' bwds     SL ER 15 reps x 2        SL HAB 3x10         SL Shoulder Flex 3x10        Behind the back towel stretch for R IR mobility 5'' hold x 15      Anterior delt raises to 90 deg 10 reps x 3 1# Bilateral; standing    Lateral raises to 90 deg 10 reps x 3 1# Bilateral; standing    B shoulder press 10 reps x 3 1# Bilateral; standing against wall    Ball on wall 20 reps up/down, L/R, circles 2# 2 sets @90 deg flex      R upper trap stretch 30'' x 3      Body blade at 0 deg 30'' x 3  ER/IR           PRONE:           Retraction 2x10, 5\"        Ys, Ts, and Is 15 reps x 2    full available range           T-band:       Rows 2x15 Blue     Ext 15 reps x 3 Green tubing     ER at 0 deg 15 reps x 2 Green tubing     IR at 0 deg 15 reps x 2 Green tubing     Bilateral horizontal abduction 8 reps x 3 Lime Seated    Banded scapular clocks 30 reps x 3 Lime Reaching to 10:00 and 2:00    Other:   Final HEP instructions/review (see chart) x 8 min  Formal subjective & objective reassessment x 20 min      Treatment Charges: Mins Units   []  Modalities     [x]  Ther Exercise 8 1   []  Manual Therapy     [x]  Ther Activities 20 1   []  Neuro Re-Ed     []  Vasocompression     []  Other     Total Treatment time 28 2       Assessment: [x] Progressing toward goals.      [] No change. [x] Other: Appropriate to discharge to independent HEP      1/6: Pt has been seen for 16 PT visits to date for her chronic R shoulder pain and stiffness. Overall, demonstrates excellent progress to date with no recent pain or restrictions with any ADLs. Pt demonstrates full symmetrical AROM in all planes, other than mild residual limitations with Apley IR reach behind back. Strength is WFL and symmetrical bilaterally. Pt states that she is functioning at her desired level of function and is appropriate to discharge to an independent HEP at this time. Final written HEP provided today with good understanding and long term prognosis. STG: (to be met in 6 treatments)  1. ? Pain: Pt to decrease pain levels to 2/10 with all activities to ease ADLs and use of R shoulder. GOAL MET 1/6  2. ? ROM: Increase AROM of R shoulder to 150* flexion/abduction and ER to T2 and IR to T9 to help improve functional ROM for self-care. GOAL MET 1/6  3. Independent with Home Exercise Programs GOAL MET 1/6     LTG: (to be met in 12 treatments)  1. Improve score of QuickDASH from 48% impaired to < 20% impaired to improve functional use of R arm for ADLs. GOAL MET 1/6  2. ? Strength: Improve R shoulder strength to 5/5 grossly to decrease need for compensatory techniques with ADLs. GOAL PROGRESSING W/LONG TERM HEP 1/6  3. Pt will demonstrate full, pain-free AROM of R arm in all planes to allow for normal use of shoulder without need for compensatory techniques. GOAL MET 1/6  4. Decrease pain to 0/10 with all activities GOAL MET 1/6     Patient goals: Improve strength    Pt. Education:  [x] Yes  [] No  [x] Reviewed Prior HEP/Ed  Method of Education: [x] Verbal  [x] Demo  [] Written  Comprehension of Education:  [x] Verbalizes understanding. [x] Demonstrates understanding. [x] Needs review. [] Demonstrates/verbalizes HEP/Ed previously given.      Plan: [x] Discharge to independent HEP      Time In: 8:02am            Time Out: 8:30am    Electronically signed by:  Lisa Pierre PT

## 2021-01-14 ENCOUNTER — HOSPITAL ENCOUNTER (OUTPATIENT)
Dept: GENERAL RADIOLOGY | Facility: CLINIC | Age: 66
Discharge: HOME | End: 2021-01-16
Payer: MEDICARE

## 2021-01-14 ENCOUNTER — HOSPITAL ENCOUNTER (OUTPATIENT)
Age: 66
Setting detail: SPECIMEN
Discharge: HOME | End: 2021-01-14
Payer: MEDICARE

## 2021-01-14 ENCOUNTER — HOSPITAL ENCOUNTER (OUTPATIENT)
Facility: CLINIC | Age: 66
Discharge: HOME | End: 2021-01-16
Payer: MEDICARE

## 2021-01-14 ENCOUNTER — NURSE ONLY (OUTPATIENT)
Dept: FAMILY MEDICINE CLINIC | Age: 66
End: 2021-01-14

## 2021-01-14 DIAGNOSIS — R53.1 WEAKNESS: ICD-10-CM

## 2021-01-14 DIAGNOSIS — R06.00 DYSPNEA, UNSPECIFIED TYPE: ICD-10-CM

## 2021-01-14 DIAGNOSIS — R00.0 TACHYCARDIA: ICD-10-CM

## 2021-01-14 DIAGNOSIS — R19.7 DIARRHEA, UNSPECIFIED TYPE: ICD-10-CM

## 2021-01-14 DIAGNOSIS — B34.9 VIRAL ILLNESS: ICD-10-CM

## 2021-01-14 DIAGNOSIS — Z20.822 ENCOUNTER FOR LABORATORY TESTING FOR COVID-19 VIRUS: Primary | ICD-10-CM

## 2021-01-14 DIAGNOSIS — R63.0 DECREASED APPETITE: ICD-10-CM

## 2021-01-14 LAB
ABSOLUTE EOS #: <0.03 K/UL (ref 0–0.44)
ABSOLUTE IMMATURE GRANULOCYTE: 0.03 K/UL (ref 0–0.3)
ABSOLUTE LYMPH #: 0.86 K/UL (ref 1.1–3.7)
ABSOLUTE MONO #: 0.88 K/UL (ref 0.1–1.2)
ALBUMIN SERPL-MCNC: 3.8 G/DL (ref 3.5–5.2)
ALBUMIN/GLOBULIN RATIO: 1.1 (ref 1–2.5)
ALP BLD-CCNC: 129 U/L (ref 35–104)
ALT SERPL-CCNC: 75 U/L (ref 5–33)
ANION GAP SERPL CALCULATED.3IONS-SCNC: 16 MMOL/L (ref 9–17)
AST SERPL-CCNC: 48 U/L
BASOPHILS # BLD: 0 % (ref 0–2)
BASOPHILS ABSOLUTE: 0.03 K/UL (ref 0–0.2)
BILIRUB SERPL-MCNC: 0.56 MG/DL (ref 0.3–1.2)
BUN BLDV-MCNC: 23 MG/DL (ref 8–23)
BUN/CREAT BLD: ABNORMAL (ref 9–20)
CALCIUM SERPL-MCNC: 9.3 MG/DL (ref 8.6–10.4)
CHLORIDE BLD-SCNC: 102 MMOL/L (ref 98–107)
CO2: 22 MMOL/L (ref 20–31)
CREAT SERPL-MCNC: 0.93 MG/DL (ref 0.5–0.9)
D-DIMER QUANTITATIVE: 0.56 MG/L FEU
DIFFERENTIAL TYPE: ABNORMAL
EOSINOPHILS RELATIVE PERCENT: 0 % (ref 1–4)
GFR AFRICAN AMERICAN: >60 ML/MIN
GFR NON-AFRICAN AMERICAN: >60 ML/MIN
GFR SERPL CREATININE-BSD FRML MDRD: ABNORMAL ML/MIN/{1.73_M2}
GFR SERPL CREATININE-BSD FRML MDRD: ABNORMAL ML/MIN/{1.73_M2}
GLUCOSE BLD-MCNC: 103 MG/DL (ref 70–99)
HCT VFR BLD CALC: 48.8 % (ref 36.3–47.1)
HEMOGLOBIN: 15.4 G/DL (ref 11.9–15.1)
IMMATURE GRANULOCYTES: 0 %
LIPASE: 33 U/L (ref 13–60)
LYMPHOCYTES # BLD: 10 % (ref 24–43)
MAGNESIUM: 1.9 MG/DL (ref 1.6–2.6)
MCH RBC QN AUTO: 27.2 PG (ref 25.2–33.5)
MCHC RBC AUTO-ENTMCNC: 31.6 G/DL (ref 28.4–34.8)
MCV RBC AUTO: 86.2 FL (ref 82.6–102.9)
MONOCYTES # BLD: 11 % (ref 3–12)
NRBC AUTOMATED: 0 PER 100 WBC
PDW BLD-RTO: 12.9 % (ref 11.8–14.4)
PLATELET # BLD: 307 K/UL (ref 138–453)
PLATELET ESTIMATE: ABNORMAL
PMV BLD AUTO: 10.7 FL (ref 8.1–13.5)
POTASSIUM SERPL-SCNC: 3.8 MMOL/L (ref 3.7–5.3)
RBC # BLD: 5.66 M/UL (ref 3.95–5.11)
RBC # BLD: ABNORMAL 10*6/UL
SEG NEUTROPHILS: 79 % (ref 36–65)
SEGMENTED NEUTROPHILS ABSOLUTE COUNT: 6.46 K/UL (ref 1.5–8.1)
SODIUM BLD-SCNC: 140 MMOL/L (ref 135–144)
TOTAL PROTEIN: 7.4 G/DL (ref 6.4–8.3)
WBC # BLD: 8.3 K/UL (ref 3.5–11.3)
WBC # BLD: ABNORMAL 10*3/UL

## 2021-01-14 PROCEDURE — 71046 X-RAY EXAM CHEST 2 VIEWS: CPT

## 2021-01-15 LAB
CULTURE: NORMAL
Lab: NORMAL
SPECIMEN DESCRIPTION: NORMAL

## 2021-01-17 LAB — SARS-COV-2, NAA: DETECTED

## 2021-01-21 ENCOUNTER — HOSPITAL ENCOUNTER (OUTPATIENT)
Dept: GENERAL RADIOLOGY | Facility: CLINIC | Age: 66
Discharge: HOME | End: 2021-01-23
Payer: MEDICARE

## 2021-01-21 ENCOUNTER — HOSPITAL ENCOUNTER (OUTPATIENT)
Facility: CLINIC | Age: 66
Discharge: HOME | End: 2021-01-23
Payer: MEDICARE

## 2021-01-21 DIAGNOSIS — J12.82 PNEUMONIA DUE TO COVID-19 VIRUS: ICD-10-CM

## 2021-01-21 DIAGNOSIS — U07.1 PNEUMONIA DUE TO COVID-19 VIRUS: ICD-10-CM

## 2021-01-21 PROCEDURE — 71046 X-RAY EXAM CHEST 2 VIEWS: CPT

## 2021-02-22 ENCOUNTER — OFFICE VISIT (OUTPATIENT)
Dept: ORTHOPEDIC SURGERY | Age: 66
End: 2021-02-22

## 2021-02-22 VITALS — TEMPERATURE: 97.2 F | HEIGHT: 67 IN | RESPIRATION RATE: 14 BRPM | WEIGHT: 180 LBS | BODY MASS INDEX: 28.25 KG/M2

## 2021-02-22 DIAGNOSIS — M75.41 ROTATOR CUFF IMPINGEMENT SYNDROME, RIGHT: Primary | ICD-10-CM

## 2021-02-22 PROCEDURE — 99024 POSTOP FOLLOW-UP VISIT: CPT | Performed by: ORTHOPAEDIC SURGERY

## 2021-02-22 NOTE — PROGRESS NOTES
HPI: Ms. Reny Zamora is here today for follow-up evaluation of her right shoulder. She was last seen in my clinic back in November 2020 at which time we reviewed her MRI and she was diagnosed with some rotator cuff tendinitis. Physical therapy was prescribed. She completed her physical therapy at the beginning of the year and states that she is doing well really having no pain in the shoulder. On examination today she has full range of motion through the right shoulder with active elevation of 145 degrees, 155 degrees of abduction, 85 degrees of external rotation internal rotation to T10 which is symmetric to the contralateral side. She has a pain-free arc of motion. She has 5/5 muscle strength with resisted deltoid, supraspinatus, external rotation internal rotation testing. She has negative impingement signs. No instability. Impression and plan: Ms. Reny Zamora is a 57-year-old with right shoulder rotator cuff tendinitis thathas responded well to conservative management with the use of NSAIDs as well as physical therapy. Following our last encounter she was instructed to follow-up as needed however states that she was called afterwards to set up today's visit. From my standpoint she can keep up with her home exercise program and activities as tolerated. I will have her follow-up as needed.

## 2022-08-17 ENCOUNTER — TELEPHONE (OUTPATIENT)
Dept: VASCULAR SURGERY | Age: 67
End: 2022-08-17

## 2022-08-17 NOTE — TELEPHONE ENCOUNTER
Receivied a referral for Hyperpigmented skin lesion and  Vasculitis of skin  Any testing you would like prior?

## 2023-02-02 PROBLEM — E78.5 HYPERLIPIDEMIA: Status: ACTIVE | Noted: 2023-02-02

## 2023-02-02 PROBLEM — R21 RASH AND NONSPECIFIC SKIN ERUPTION: Status: ACTIVE | Noted: 2023-02-02

## 2023-02-02 PROBLEM — R73.03 PRE-DIABETES: Status: ACTIVE | Noted: 2023-02-02

## 2023-02-02 PROBLEM — G43.109 MIGRAINE WITH AURA AND WITHOUT STATUS MIGRAINOSUS, NOT INTRACTABLE: Status: ACTIVE | Noted: 2023-02-02

## 2023-02-02 PROBLEM — R06.02 POST-COVID CHRONIC SHORTNESS OF BREATH: Status: ACTIVE | Noted: 2023-02-02

## 2023-02-02 PROBLEM — U09.9 POST-COVID CHRONIC SHORTNESS OF BREATH: Status: ACTIVE | Noted: 2023-02-02

## 2023-06-19 ENCOUNTER — OFFICE VISIT (OUTPATIENT)
Dept: DERMATOLOGY | Age: 68
End: 2023-06-19
Payer: COMMERCIAL

## 2023-06-19 VITALS
WEIGHT: 188 LBS | DIASTOLIC BLOOD PRESSURE: 86 MMHG | HEIGHT: 67 IN | HEART RATE: 74 BPM | SYSTOLIC BLOOD PRESSURE: 168 MMHG | BODY MASS INDEX: 29.51 KG/M2 | TEMPERATURE: 97.2 F | OXYGEN SATURATION: 93 %

## 2023-06-19 DIAGNOSIS — L59.0 ERYTHEMA AB IGNE: Primary | ICD-10-CM

## 2023-06-19 PROCEDURE — 99203 OFFICE O/P NEW LOW 30 MIN: CPT | Performed by: DERMATOLOGY

## 2023-06-19 PROCEDURE — 3077F SYST BP >= 140 MM HG: CPT | Performed by: DERMATOLOGY

## 2023-06-19 PROCEDURE — 3079F DIAST BP 80-89 MM HG: CPT | Performed by: DERMATOLOGY

## 2023-06-19 PROCEDURE — 1123F ACP DISCUSS/DSCN MKR DOCD: CPT | Performed by: DERMATOLOGY

## 2023-06-19 NOTE — PATIENT INSTRUCTIONS
- recommend decreasing use of heating pad   - recommend discussing other treatments for back pain with PCP
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
## 2023-08-02 PROBLEM — R52 PAINFUL CUTANEOUS SCAR: Status: RESOLVED | Noted: 2019-06-10 | Resolved: 2023-08-02

## 2023-08-02 PROBLEM — L90.5 PAINFUL CUTANEOUS SCAR: Status: RESOLVED | Noted: 2019-06-10 | Resolved: 2023-08-02

## 2023-10-27 ENCOUNTER — OFFICE VISIT (OUTPATIENT)
Dept: FAMILY MEDICINE CLINIC | Age: 68
End: 2023-10-27

## 2023-10-27 VITALS
SYSTOLIC BLOOD PRESSURE: 142 MMHG | HEART RATE: 88 BPM | DIASTOLIC BLOOD PRESSURE: 88 MMHG | OXYGEN SATURATION: 96 % | WEIGHT: 185 LBS | BODY MASS INDEX: 28.98 KG/M2

## 2023-10-27 DIAGNOSIS — J40 BRONCHITIS: Primary | ICD-10-CM

## 2023-10-27 RX ORDER — PREDNISONE 20 MG/1
20 TABLET ORAL 2 TIMES DAILY
Qty: 10 TABLET | Refills: 0 | Status: SHIPPED | OUTPATIENT
Start: 2023-10-27 | End: 2023-11-01

## 2023-10-27 RX ORDER — AZITHROMYCIN 250 MG/1
TABLET, FILM COATED ORAL
Qty: 1 PACKET | Refills: 0 | Status: SHIPPED | OUTPATIENT
Start: 2023-10-27 | End: 2023-11-06

## 2023-10-27 RX ORDER — BENZONATATE 100 MG/1
100 CAPSULE ORAL 3 TIMES DAILY PRN
Qty: 30 CAPSULE | Refills: 0 | Status: SHIPPED | OUTPATIENT
Start: 2023-10-27 | End: 2023-11-06

## 2023-10-27 ASSESSMENT — ENCOUNTER SYMPTOMS
SORE THROAT: 1
TROUBLE SWALLOWING: 0
NAUSEA: 0
VOICE CHANGE: 0
VOMITING: 0
CHEST TIGHTNESS: 1
WHEEZING: 0
COUGH: 1
SHORTNESS OF BREATH: 0

## 2023-10-27 NOTE — PROGRESS NOTES
agreed with treatment plan. Follow up as directed.      Electronically signed by DANGELO Oropeza CNP on 10/27/2023 at 11:02 AM

## 2024-10-01 ENCOUNTER — HOSPITAL ENCOUNTER (OUTPATIENT)
Age: 69
Setting detail: SPECIMEN
Discharge: HOME | End: 2024-10-01

## 2024-10-01 DIAGNOSIS — E78.5 HYPERLIPIDEMIA, UNSPECIFIED HYPERLIPIDEMIA TYPE: ICD-10-CM

## 2024-10-01 DIAGNOSIS — R73.03 PRE-DIABETES: ICD-10-CM

## 2024-10-01 DIAGNOSIS — I10 BENIGN ESSENTIAL HTN: Chronic | ICD-10-CM

## 2024-10-01 LAB
ALBUMIN SERPL-MCNC: 3.9 G/DL (ref 3.5–5.2)
ALBUMIN/GLOB SERPL: 1 {RATIO} (ref 1–2.5)
ALP SERPL-CCNC: 117 U/L (ref 35–104)
ALT SERPL-CCNC: 16 U/L (ref 10–35)
ANION GAP SERPL CALCULATED.3IONS-SCNC: 8 MMOL/L (ref 9–16)
AST SERPL-CCNC: 19 U/L (ref 10–35)
BASOPHILS # BLD: 0.05 K/UL (ref 0–0.2)
BASOPHILS NFR BLD: 1 % (ref 0–2)
BILIRUB SERPL-MCNC: 0.3 MG/DL (ref 0–1.2)
BUN SERPL-MCNC: 18 MG/DL (ref 8–23)
CALCIUM SERPL-MCNC: 9.5 MG/DL (ref 8.6–10.4)
CHLORIDE SERPL-SCNC: 105 MMOL/L (ref 98–107)
CHOLEST SERPL-MCNC: 231 MG/DL (ref 0–199)
CHOLESTEROL/HDL RATIO: 5
CO2 SERPL-SCNC: 27 MMOL/L (ref 20–31)
CREAT SERPL-MCNC: 1 MG/DL (ref 0.5–0.9)
EOSINOPHIL # BLD: 0.17 K/UL (ref 0–0.44)
EOSINOPHILS RELATIVE PERCENT: 3 % (ref 1–4)
ERYTHROCYTE [DISTWIDTH] IN BLOOD BY AUTOMATED COUNT: 13.1 % (ref 11.8–14.4)
EST. AVERAGE GLUCOSE BLD GHB EST-MCNC: 134 MG/DL
GFR, ESTIMATED: 58 ML/MIN/1.73M2
GLUCOSE SERPL-MCNC: 106 MG/DL (ref 74–99)
HBA1C MFR BLD: 6.3 % (ref 4–6)
HCT VFR BLD AUTO: 48.9 % (ref 36.3–47.1)
HDLC SERPL-MCNC: 50 MG/DL
HGB BLD-MCNC: 15.3 G/DL (ref 11.9–15.1)
IMM GRANULOCYTES # BLD AUTO: <0.03 K/UL (ref 0–0.3)
IMM GRANULOCYTES NFR BLD: 0 %
LDLC SERPL CALC-MCNC: 155 MG/DL (ref 0–100)
LYMPHOCYTES NFR BLD: 1.28 K/UL (ref 1.1–3.7)
LYMPHOCYTES RELATIVE PERCENT: 24 % (ref 24–43)
MCH RBC QN AUTO: 27.7 PG (ref 25.2–33.5)
MCHC RBC AUTO-ENTMCNC: 31.3 G/DL (ref 28.4–34.8)
MCV RBC AUTO: 88.4 FL (ref 82.6–102.9)
MONOCYTES NFR BLD: 0.7 K/UL (ref 0.1–1.2)
MONOCYTES NFR BLD: 13 % (ref 3–12)
NEUTROPHILS NFR BLD: 59 % (ref 36–65)
NEUTS SEG NFR BLD: 3.19 K/UL (ref 1.5–8.1)
NRBC BLD-RTO: 0 PER 100 WBC
PLATELET # BLD AUTO: 299 K/UL (ref 138–453)
PMV BLD AUTO: 10 FL (ref 8.1–13.5)
POTASSIUM SERPL-SCNC: 4.7 MMOL/L (ref 3.7–5.3)
PROT SERPL-MCNC: 6.9 G/DL (ref 6.6–8.7)
RBC # BLD AUTO: 5.53 M/UL (ref 3.95–5.11)
SODIUM SERPL-SCNC: 140 MMOL/L (ref 136–145)
TRIGL SERPL-MCNC: 128 MG/DL (ref 0–149)
VLDLC SERPL CALC-MCNC: 26 MG/DL
WBC OTHER # BLD: 5.4 K/UL (ref 3.5–11.3)

## 2024-11-04 ENCOUNTER — OFFICE VISIT (OUTPATIENT)
Dept: FAMILY MEDICINE CLINIC | Age: 69
End: 2024-11-04
Payer: COMMERCIAL

## 2024-11-04 VITALS
TEMPERATURE: 98.1 F | DIASTOLIC BLOOD PRESSURE: 83 MMHG | HEART RATE: 82 BPM | SYSTOLIC BLOOD PRESSURE: 122 MMHG | OXYGEN SATURATION: 96 %

## 2024-11-04 DIAGNOSIS — J40 BRONCHITIS: Primary | ICD-10-CM

## 2024-11-04 PROCEDURE — 3074F SYST BP LT 130 MM HG: CPT | Performed by: FAMILY MEDICINE

## 2024-11-04 PROCEDURE — 3079F DIAST BP 80-89 MM HG: CPT | Performed by: FAMILY MEDICINE

## 2024-11-04 PROCEDURE — 99213 OFFICE O/P EST LOW 20 MIN: CPT | Performed by: FAMILY MEDICINE

## 2024-11-04 PROCEDURE — 1123F ACP DISCUSS/DSCN MKR DOCD: CPT | Performed by: FAMILY MEDICINE

## 2024-11-04 RX ORDER — BENZONATATE 100 MG/1
100 CAPSULE ORAL 3 TIMES DAILY PRN
Qty: 30 CAPSULE | Refills: 0 | Status: SHIPPED | OUTPATIENT
Start: 2024-11-04 | End: 2024-12-04

## 2024-11-04 RX ORDER — FLUTICASONE PROPIONATE 50 MCG
2 SPRAY, SUSPENSION (ML) NASAL DAILY
Qty: 1 EACH | Refills: 0 | Status: SHIPPED | OUTPATIENT
Start: 2024-11-04 | End: 2024-12-04

## 2024-11-04 ASSESSMENT — ENCOUNTER SYMPTOMS
SORE THROAT: 1
COUGH: 1
WHEEZING: 0
SINUS PAIN: 0
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
## 2024-11-06 ENCOUNTER — TELEPHONE (OUTPATIENT)
Dept: FAMILY MEDICINE CLINIC | Age: 69
End: 2024-11-06

## 2024-11-06 RX ORDER — PREDNISONE 20 MG/1
40 TABLET ORAL DAILY
Qty: 10 TABLET | Refills: 0 | Status: SHIPPED | OUTPATIENT
Start: 2024-11-06 | End: 2024-11-11

## 2024-11-06 NOTE — TELEPHONE ENCOUNTER
Patient calling in stating she was seen on 11/4. Patient was told to call back if she is not feeling better. Patient is asking for Prednisone to be called in.

## 2024-12-11 ENCOUNTER — HOSPITAL ENCOUNTER (OUTPATIENT)
Age: 69
Discharge: HOME | End: 2024-12-11
Payer: COMMERCIAL

## 2024-12-11 LAB
EKG ATRIAL RATE: 74 BPM
EKG P AXIS: 38 DEGREES
EKG P-R INTERVAL: 140 MS
EKG Q-T INTERVAL: 356 MS
EKG QRS DURATION: 72 MS
EKG QTC CALCULATION (BAZETT): 395 MS
EKG R AXIS: 34 DEGREES
EKG T AXIS: 21 DEGREES
EKG VENTRICULAR RATE: 74 BPM

## 2024-12-11 PROCEDURE — 93005 ELECTROCARDIOGRAM TRACING: CPT | Performed by: STUDENT IN AN ORGANIZED HEALTH CARE EDUCATION/TRAINING PROGRAM

## 2024-12-11 PROCEDURE — 93010 ELECTROCARDIOGRAM REPORT: CPT | Performed by: INTERNAL MEDICINE

## 2025-01-31 ENCOUNTER — OFFICE VISIT (OUTPATIENT)
Dept: FAMILY MEDICINE CLINIC | Age: 70
End: 2025-01-31
Payer: COMMERCIAL

## 2025-01-31 VITALS
HEART RATE: 99 BPM | SYSTOLIC BLOOD PRESSURE: 146 MMHG | DIASTOLIC BLOOD PRESSURE: 86 MMHG | OXYGEN SATURATION: 97 % | TEMPERATURE: 98.3 F

## 2025-01-31 DIAGNOSIS — R09.82 PND (POST-NASAL DRIP): ICD-10-CM

## 2025-01-31 DIAGNOSIS — R05.1 ACUTE COUGH: ICD-10-CM

## 2025-01-31 DIAGNOSIS — J40 BRONCHITIS: Primary | ICD-10-CM

## 2025-01-31 PROCEDURE — 3077F SYST BP >= 140 MM HG: CPT | Performed by: NURSE PRACTITIONER

## 2025-01-31 PROCEDURE — 3079F DIAST BP 80-89 MM HG: CPT | Performed by: NURSE PRACTITIONER

## 2025-01-31 PROCEDURE — 1123F ACP DISCUSS/DSCN MKR DOCD: CPT | Performed by: NURSE PRACTITIONER

## 2025-01-31 PROCEDURE — 99213 OFFICE O/P EST LOW 20 MIN: CPT | Performed by: NURSE PRACTITIONER

## 2025-01-31 RX ORDER — AZITHROMYCIN 250 MG/1
TABLET, FILM COATED ORAL
Qty: 6 TABLET | Refills: 0 | Status: SHIPPED | OUTPATIENT
Start: 2025-01-31 | End: 2025-02-10

## 2025-01-31 RX ORDER — PREDNISONE 20 MG/1
20 TABLET ORAL 2 TIMES DAILY
Qty: 10 TABLET | Refills: 0 | Status: SHIPPED | OUTPATIENT
Start: 2025-01-31 | End: 2025-02-05

## 2025-01-31 RX ORDER — BENZONATATE 100 MG/1
100 CAPSULE ORAL 3 TIMES DAILY PRN
Qty: 21 CAPSULE | Refills: 0 | Status: SHIPPED | OUTPATIENT
Start: 2025-01-31 | End: 2025-02-07

## 2025-01-31 ASSESSMENT — ENCOUNTER SYMPTOMS
STRIDOR: 0
COUGH: 1
SWOLLEN GLANDS: 0
RHINORRHEA: 1
SORE THROAT: 0
WHEEZING: 0
NAUSEA: 0
SINUS PAIN: 0
ABDOMINAL PAIN: 0
CHEST TIGHTNESS: 0
VOMITING: 0
SINUS PRESSURE: 0
SHORTNESS OF BREATH: 1
DIARRHEA: 0
CHOKING: 0

## 2025-01-31 NOTE — PROGRESS NOTES
[facility] PHYSICIANS [facility], [facility] WALK-IN FAMILY MEDICINE  [address]  [address]  [facility] [address]  Dept: [phone]  Dept Fax: [phone]    Jahaira Huston is a 69 y.o. female who presents to the urgent care today for her medical conditions/complaints as notedbelow.  Jahaira Huston is c/o of Cold Symptoms (Onset since Wednesday with cough, chest congestion, and runny nose. Otc mucenix, ibuprofen , inhaler, flonase and cough drops. Negative covid test on Thursday. )      HPI:     69-year-old female presents for cold-like symptoms for 2 days with cough, chest congestion, runny nose.  No known ill exposures but was at a conference  Negative home COVID test yesterday, no change in baseline with sob, declines additional covid testing - waited over 24 hours before taking covid test  Hx Covid lung - has alb inhaler, last use 6 hrs pta.  Leaving town Tuesday to fly out of [place] to [place].   Lifelong nonsmoker    Cold Symptoms   This is a new problem. The current episode started in the past 7 days (2d). The problem has been gradually worsening. There has been no fever. Associated symptoms include congestion, coughing, headaches and rhinorrhea. Pertinent negatives include no abdominal pain, chest pain, diarrhea, dysuria, ear pain, joint pain, joint swelling, nausea, neck pain, plugged ear sensation, rash, sinus pain, sneezing, sore throat, swollen glands, vomiting or wheezing. She has tried NSAIDs (Inhaler, Flonase, cough drops, mucinex) for the symptoms. The treatment provided mild relief.       Past Medical History:   Diagnosis Date    Anxiety 06/15/2015    Benign essential HTN 06/15/2015    Chronic back pain 1998    Had surgery    Frontal headache 01/29/2015    Migraines         Current Outpatient Medications   Medication Sig Dispense Refill    predniSONE (DELTASONE) 20 MG tablet Take 1 tablet by mouth 2 times daily for 5 days 10 tablet 0    benzonatate (TESSALON

## 2025-02-18 ENCOUNTER — HOSPITAL ENCOUNTER (OUTPATIENT)
Dept: SLEEP CENTER | Age: 70
Discharge: HOME | End: 2025-02-20
Payer: COMMERCIAL

## 2025-02-18 PROCEDURE — 95810 POLYSOM 6/> YRS 4/> PARAM: CPT

## 2025-02-19 VITALS
HEIGHT: 67 IN | HEART RATE: 65 BPM | BODY MASS INDEX: 29.03 KG/M2 | WEIGHT: 185 LBS | OXYGEN SATURATION: 89 % | RESPIRATION RATE: 12 BRPM

## 2025-02-19 ASSESSMENT — SLEEP AND FATIGUE QUESTIONNAIRES
HOW LIKELY ARE YOU TO NOD OFF OR FALL ASLEEP WHILE SITTING AND READING: WOULD NEVER DOZE
HOW LIKELY ARE YOU TO NOD OFF OR FALL ASLEEP IN A CAR, WHILE STOPPED FOR A FEW MINUTES IN TRAFFIC: WOULD NEVER DOZE
HOW LIKELY ARE YOU TO NOD OFF OR FALL ASLEEP WHILE LYING DOWN TO REST IN THE AFTERNOON WHEN CIRCUMSTANCES PERMIT: WOULD NEVER DOZE
HOW LIKELY ARE YOU TO NOD OFF OR FALL ASLEEP WHEN YOU ARE A PASSENGER IN A CAR FOR AN HOUR WITHOUT A BREAK: WOULD NEVER DOZE
HOW LIKELY ARE YOU TO NOD OFF OR FALL ASLEEP WHILE SITTING AND TALKING TO SOMEONE: WOULD NEVER DOZE
HOW LIKELY ARE YOU TO NOD OFF OR FALL ASLEEP WHILE WATCHING TV: SLIGHT CHANCE OF DOZING
HOW LIKELY ARE YOU TO NOD OFF OR FALL ASLEEP WHILE SITTING INACTIVE IN A PUBLIC PLACE: WOULD NEVER DOZE

## 2025-02-26 PROBLEM — G47.33 OBSTRUCTIVE SLEEP APNEA SYNDROME: Status: ACTIVE | Noted: 2025-02-26

## 2025-03-02 DIAGNOSIS — G47.33 OSA (OBSTRUCTIVE SLEEP APNEA): Primary | ICD-10-CM

## 2025-03-17 ENCOUNTER — HOSPITAL ENCOUNTER (OUTPATIENT)
Dept: SLEEP CENTER | Age: 70
Discharge: HOME | End: 2025-03-19
Payer: COMMERCIAL

## 2025-03-17 DIAGNOSIS — G47.33 OSA (OBSTRUCTIVE SLEEP APNEA): ICD-10-CM

## 2025-03-17 PROCEDURE — 95811 POLYSOM 6/>YRS CPAP 4/> PARM: CPT

## 2025-03-18 VITALS
HEIGHT: 67 IN | RESPIRATION RATE: 12 BRPM | OXYGEN SATURATION: 95 % | BODY MASS INDEX: 29.03 KG/M2 | WEIGHT: 185 LBS | HEART RATE: 57 BPM

## 2025-03-18 ASSESSMENT — SLEEP AND FATIGUE QUESTIONNAIRES
HOW LIKELY ARE YOU TO NOD OFF OR FALL ASLEEP IN A CAR, WHILE STOPPED FOR A FEW MINUTES IN TRAFFIC: WOULD NEVER DOZE
HOW LIKELY ARE YOU TO NOD OFF OR FALL ASLEEP WHILE SITTING AND TALKING TO SOMEONE: WOULD NEVER DOZE
ESS TOTAL SCORE: 1
HOW LIKELY ARE YOU TO NOD OFF OR FALL ASLEEP WHILE WATCHING TV: SLIGHT CHANCE OF DOZING
HOW LIKELY ARE YOU TO NOD OFF OR FALL ASLEEP WHILE LYING DOWN TO REST IN THE AFTERNOON WHEN CIRCUMSTANCES PERMIT: WOULD NEVER DOZE
HOW LIKELY ARE YOU TO NOD OFF OR FALL ASLEEP WHEN YOU ARE A PASSENGER IN A CAR FOR AN HOUR WITHOUT A BREAK: WOULD NEVER DOZE
HOW LIKELY ARE YOU TO NOD OFF OR FALL ASLEEP WHILE SITTING QUIETLY AFTER LUNCH WITHOUT ALCOHOL: WOULD NEVER DOZE
HOW LIKELY ARE YOU TO NOD OFF OR FALL ASLEEP WHILE SITTING INACTIVE IN A PUBLIC PLACE: WOULD NEVER DOZE
HOW LIKELY ARE YOU TO NOD OFF OR FALL ASLEEP WHILE SITTING AND READING: WOULD NEVER DOZE

## 2025-04-22 DIAGNOSIS — R05.1 ACUTE COUGH: ICD-10-CM

## 2025-04-22 DIAGNOSIS — R09.82 PND (POST-NASAL DRIP): ICD-10-CM

## 2025-04-22 DIAGNOSIS — J40 BRONCHITIS: ICD-10-CM

## 2025-04-22 RX ORDER — BENZONATATE 100 MG/1
CAPSULE ORAL
Qty: 21 CAPSULE | OUTPATIENT
Start: 2025-04-22

## 2025-04-22 RX ORDER — PREDNISONE 20 MG/1
TABLET ORAL
Qty: 10 TABLET | Refills: 0 | OUTPATIENT
Start: 2025-04-22

## 2025-04-22 RX ORDER — AZITHROMYCIN 250 MG/1
TABLET, FILM COATED ORAL
Qty: 6 TABLET | Refills: 0 | OUTPATIENT
Start: 2025-04-22

## 2025-06-25 ENCOUNTER — OFFICE VISIT (OUTPATIENT)
Dept: NEUROLOGY | Age: 70
End: 2025-06-25
Payer: COMMERCIAL

## 2025-06-25 VITALS
DIASTOLIC BLOOD PRESSURE: 94 MMHG | HEIGHT: 67 IN | WEIGHT: 183 LBS | BODY MASS INDEX: 28.72 KG/M2 | SYSTOLIC BLOOD PRESSURE: 136 MMHG | HEART RATE: 75 BPM

## 2025-06-25 DIAGNOSIS — G43.711 CHRONIC MIGRAINE WITHOUT AURA, WITH INTRACTABLE MIGRAINE, SO STATED, WITH STATUS MIGRAINOSUS: Primary | ICD-10-CM

## 2025-06-25 PROCEDURE — 99204 OFFICE O/P NEW MOD 45 MIN: CPT | Performed by: PSYCHIATRY & NEUROLOGY

## 2025-06-25 PROCEDURE — 3079F DIAST BP 80-89 MM HG: CPT | Performed by: PSYCHIATRY & NEUROLOGY

## 2025-06-25 PROCEDURE — 1123F ACP DISCUSS/DSCN MKR DOCD: CPT | Performed by: PSYCHIATRY & NEUROLOGY

## 2025-06-25 PROCEDURE — 3075F SYST BP GE 130 - 139MM HG: CPT | Performed by: PSYCHIATRY & NEUROLOGY

## 2025-06-25 RX ORDER — LOPERAMIDE HYDROCHLORIDE 2 MG/1
2 CAPSULE ORAL 4 TIMES DAILY PRN
COMMUNITY

## 2025-06-25 RX ORDER — FLUTICASONE PROPIONATE 50 MCG
1 SPRAY, SUSPENSION (ML) NASAL DAILY PRN
COMMUNITY

## 2025-06-25 RX ORDER — ATOGEPANT 60 MG/1
60 TABLET ORAL DAILY
Qty: 30 TABLET | Refills: 2 | Status: SHIPPED | OUTPATIENT
Start: 2025-06-25 | End: 2025-07-25

## 2025-06-25 RX ORDER — RIMEGEPANT SULFATE 75 MG/75MG
75 TABLET, ORALLY DISINTEGRATING ORAL PRN
Qty: 30 TABLET | Refills: 1 | Status: SHIPPED | OUTPATIENT
Start: 2025-06-25 | End: 2025-07-25

## 2025-06-25 RX ORDER — IBUPROFEN 200 MG
200 TABLET ORAL EVERY 6 HOURS PRN
COMMUNITY
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
## 2025-06-27 ENCOUNTER — TELEPHONE (OUTPATIENT)
Dept: NEUROLOGY | Age: 70
End: 2025-06-27

## 2025-06-27 NOTE — TELEPHONE ENCOUNTER
Patient left a message on the clinical VM stating that she just came from her pharmacy and she wasn't able to  the two medications that Dr. Feliciano ordered.  Patient stated that she didn't want to wait over the weekend and wanted this addressed today.  Writer reviewed the chart and spoke with office nurse doing the prior auths this week.  Writer placed a call back to the patient and explained that the providers note wasn't signed yet so we couldn't start the prior authorization process with her insurance yet.  Writer advised that Dr. Feliciano was out of the office until Monday.  Patient verbally stated her understanding.

## 2025-06-30 ENCOUNTER — TELEPHONE (OUTPATIENT)
Dept: NEUROLOGY | Age: 70
End: 2025-06-30

## 2025-06-30 NOTE — TELEPHONE ENCOUNTER
I contacted [facility] pharmacy and spoke with [employer]. Confirmed that the copay for 8/16 tablets is $160. Attempted to contact patient to see how they would like to proceed; left message to call back.

## 2025-06-30 NOTE — TELEPHONE ENCOUNTER
06 30 2025 [facility] Notice of dismissal of coverage request--[employer], correspondence attached.  KS

## 2025-07-06 ENCOUNTER — OFFICE VISIT (OUTPATIENT)
Dept: FAMILY MEDICINE CLINIC | Age: 70
End: 2025-07-06

## 2025-07-06 VITALS
OXYGEN SATURATION: 98 % | SYSTOLIC BLOOD PRESSURE: 151 MMHG | HEIGHT: 67 IN | DIASTOLIC BLOOD PRESSURE: 87 MMHG | HEART RATE: 76 BPM | WEIGHT: 183 LBS | BODY MASS INDEX: 28.72 KG/M2

## 2025-07-06 DIAGNOSIS — L30.9 DERMATITIS: Primary | ICD-10-CM

## 2025-07-06 RX ORDER — CETIRIZINE HYDROCHLORIDE 10 MG/1
10 TABLET ORAL DAILY
Qty: 30 TABLET | Refills: 0 | Status: SHIPPED | OUTPATIENT
Start: 2025-07-06 | End: 2025-08-05

## 2025-07-06 RX ORDER — HYDROXYZINE HYDROCHLORIDE 25 MG/1
25 TABLET, FILM COATED ORAL EVERY 8 HOURS PRN
Qty: 30 TABLET | Refills: 0 | Status: SHIPPED | OUTPATIENT
Start: 2025-07-06 | End: 2025-07-16

## 2025-07-06 RX ORDER — PREDNISONE 20 MG/1
40 TABLET ORAL DAILY
Qty: 10 TABLET | Refills: 0 | Status: SHIPPED | OUTPATIENT
Start: 2025-07-06 | End: 2025-07-11